# Patient Record
Sex: FEMALE | Race: NATIVE HAWAIIAN OR OTHER PACIFIC ISLANDER | HISPANIC OR LATINO | Employment: FULL TIME | URBAN - METROPOLITAN AREA
[De-identification: names, ages, dates, MRNs, and addresses within clinical notes are randomized per-mention and may not be internally consistent; named-entity substitution may affect disease eponyms.]

---

## 2017-01-08 ENCOUNTER — HOSPITAL ENCOUNTER (EMERGENCY)
Facility: HOSPITAL | Age: 37
Discharge: HOME | End: 2017-01-08
Attending: EMERGENCY MEDICINE
Payer: COMMERCIAL

## 2017-01-08 VITALS
HEART RATE: 68 BPM | WEIGHT: 117 LBS | OXYGEN SATURATION: 100 % | TEMPERATURE: 98 F | RESPIRATION RATE: 16 BRPM | HEIGHT: 61 IN | DIASTOLIC BLOOD PRESSURE: 68 MMHG | SYSTOLIC BLOOD PRESSURE: 102 MMHG | BODY MASS INDEX: 22.09 KG/M2

## 2017-01-08 DIAGNOSIS — R06.02 SOB (SHORTNESS OF BREATH): ICD-10-CM

## 2017-01-08 DIAGNOSIS — M62.830 MUSCLE SPASM OF BACK: Primary | ICD-10-CM

## 2017-01-08 LAB
B-HCG UR QL: NEGATIVE
CTP QC/QA: YES

## 2017-01-08 PROCEDURE — 25000003 PHARM REV CODE 250: Performed by: NURSE PRACTITIONER

## 2017-01-08 PROCEDURE — 93005 ELECTROCARDIOGRAM TRACING: CPT

## 2017-01-08 PROCEDURE — 99284 EMERGENCY DEPT VISIT MOD MDM: CPT | Mod: 25

## 2017-01-08 PROCEDURE — 81025 URINE PREGNANCY TEST: CPT | Performed by: NURSE PRACTITIONER

## 2017-01-08 RX ORDER — ASPIRIN 81 MG/1
81 TABLET ORAL DAILY
COMMUNITY

## 2017-01-08 RX ORDER — METHOCARBAMOL 500 MG/1
1000 TABLET, FILM COATED ORAL 3 TIMES DAILY
Qty: 30 TABLET | Refills: 0 | Status: SHIPPED | OUTPATIENT
Start: 2017-01-08 | End: 2017-01-13

## 2017-01-08 RX ORDER — NAPROXEN 500 MG/1
500 TABLET ORAL 2 TIMES DAILY WITH MEALS
Qty: 14 TABLET | Refills: 0 | Status: SHIPPED | OUTPATIENT
Start: 2017-01-08

## 2017-01-08 RX ORDER — NAPROXEN 500 MG/1
500 TABLET ORAL
Status: COMPLETED | OUTPATIENT
Start: 2017-01-08 | End: 2017-01-08

## 2017-01-08 RX ADMIN — NAPROXEN 500 MG: 500 TABLET ORAL at 05:01

## 2017-01-08 NOTE — ED AVS SNAPSHOT
[facility] [address] [place] [address]               Babita Grewal   2017  5:28 PM   ED    Descripción:  Female : 1980   Departamento:  [facility]           Apodaca Cuidado fue coordinado por:     Provider Role From To    Bao Urbano Jr., MD Attending Provider [phone] --    KEISHA Pollard Nurse Practitioner 17 --      Razón de la leslie     Shoulder Pain           Diagnósticos de Esta Visita        Comentarios    Muscle spasm of back    -  Primario     SOB (shortness of breath)           ED Disposition     Ninguna           Lista de tareas           Información de seguimiento     Realice un seguimiento con:  Jordan Michaels MD    Cómo:  Tyree faisal leslie lo antes posible    Cuándo:  2017    Especialidad:  Obstetrics and Gynecology    Información de contacto:    [address] [place] [address]  NO ACTIVE MEDICAL LICENSE  [place] [address]  [phone]        Recetas para recoger        Disp Refills Start End    naproxen (NAPROSYN) 500 MG tablet 14 tablet 0 2017     Take 1 tablet (500 mg total) by mouth 2 (two) times daily with meals. - Oral    [facility]: [facility] [id number] [place] [address] - [address] AT [facility] No. de tlfo: #: [phone]       methocarbamol (ROBAXIN) 500 MG Tab 30 tablet 0 2017    Take 2 tablets (1,000 mg total) by mouth 3 (three) times daily. - Oral    [facility]: [facility] [address] [facility], [place] - [address] AT [facility] No. de tlfo: #: [phone]         [facility] en Llamada     [facility] En Llamada Línea de Enfermeras - Asistencia   Enfermeras registradas de [facility] pueden ayudarle a reservar faisal leslie, proveer educación para la everton, asesoría clínica, y otros servicios de asesoramiento.   Llame para vitor servicio gratuito a [phone].             Medicamentos           Mensaje sobre Medicamentos      "Verificar los cambios y / o adiciones a armas régimen de medicación son los mismos que discutir con armas médico. Si cualquiera de estos cambios o adiciones son incorrectos, por favor notifique a armas proveedor de atención médica.        EMPEZAR a ashley estos medicamentos NUEVOS        Refills    naproxen (NAPROSYN) 500 MG tablet 0    Sig: Take 1 tablet (500 mg total) by mouth 2 (two) times daily with meals.    Categoría: Print    Vía: Oral    methocarbamol (ROBAXIN) 500 MG Tab 0    Sig: Take 2 tablets (1,000 mg total) by mouth 3 (three) times daily.    Categoría: Print    Vía: Oral      These medications were administered today        Dose Freq    naproxen tablet 500 mg 500 mg ED 1 Time    Sig: Take 1 tablet (500 mg total) by mouth ED 1 Time.    Categoría: Normal    Vía: Oral           Verifique que la siguiente lista de medicamentos es faisal representación exacta de los medicamentos que está tomando actualmente. Si no hay ningunos reportados, la lista puede estar en graves. Si no es correcta, por favor póngase en contacto con armas proveedor de atención médica. Lleve esta lista con usted en marybeth de emergencia.           Medicamentos Actuales     aspirin (ECOTRIN) 81 MG EC tablet Take 81 mg by mouth once daily.    ibuprofen (ADVIL,MOTRIN) 800 MG tablet Take 1 tablet (800 mg total) by mouth 3 (three) times daily.    methocarbamol (ROBAXIN) 500 MG Tab Take 2 tablets (1,000 mg total) by mouth 3 (three) times daily.    naproxen (NAPROSYN) 500 MG tablet Take 1 tablet (500 mg total) by mouth 2 (two) times daily with meals.    oxycodone-acetaminophen (PERCOCET) 5-325 mg per tablet Take 1 tablet by mouth every 6 (six) hours as needed for Pain.           Información de referencia clínica           Jes signos vitales paul     PS Pulso Temperatura Resp [place]o    119/69 (BP Location: Right arm, Patient Position: Sitting) 79 98 °F (36.7 °C) (Oral) 16 5' 1" (1.549 m) 53.1 kg (117 lb)    SpO2 BMI (IM)                99% 22.11 kg/m2     "      Alergias     A partir del:  1/8/2017           Reacciones    Shellfish Containing Products Shortness Of Breath, Swelling      Vacunas     Administradas en la fecha de la visita:  1/8/2017        None      ED Micro, Lab, POCT     Start Ordered       Status Ordering Provider    01/08/17 1742 01/08/17 1741  POCT urine pregnancy  Once      Final result       ED Imaging Orders     Start Ordered       Status Ordering Provider    01/08/17 1742 01/08/17 1741  X-Ray Chest PA And Lateral  1 time imaging      Final result         Instrucciones a shira de ankit         Contracción De Espalda [Sin Lesión] [Back Spasm, No Injury]    La contracción de los músculos de la espalda puede ocurrir luego de faisal torcedura ania de ligamento o torcedura muscular por un movimiento de giro o faisal agachada brusca. También la causa puede ser dormir en faisal posición virgie o en un colchón de virgie calidad. Algunas personas responden a la tensión emocional tensando los músculos de la espalda.  El tratamiento descrito abajo generalmente contribuirá que el dolor desaparezca en 5-7 días. El dolor que continua puede requerir faisal evaluación mayor u otro tipo de tratamiento lucie la terapia física.  A menos que usted haya tenido faisal lesión física (por ejemplo, faisal caída o un accidente de automóvil), no suelen pedirse radiografías (X-rays) para la evaluación inicial del dolor de espalda. Si el dolor persiste y no responde al tratamiento médico, es posible que le soliciten radiografías (X-rays) y otras pruebas más adelante.  Cuidado En [place]:  1. Es posible que necesite permanecer en cama los primeros días. Tess, tan pronto lucie le sea posible, comience a sentarse o pararse para evitar los problemas que pueden aparecer cuando marianna está en cama mucho tiempo (debilidad de los músculos, mayor dolor y rigidez de la espalda, coágulos de emily en las piernas).  2. Mientras esté en cama, intente buscar faisal posición que le resulte cómoda. Lo mejor es utilizar un  colchón firme. Intente acostarse de espalda con almohadas debajo de las rodillas. También puede intentar acostarse de costado con las rodillas flexionadas cerca del pecho y faisal almohada entre las rodillas.  3. Evite estar mucho tiempo sentado. Eso causa más tensión en la parte inferior de la espalda que estando de pie o caminando.  4. Seven los dos primeros días después de la lesión, aplíquese faisal COMPRESA DE HIELO sobre el área dolorida seven 20 minutos cada 2 a 4 horas. Ello reducirá la hinchazón y el dolor. El CALOR (faisal ducha caliente, un baño caliente o faisal almohadilla térmica) funciona shreyas para los espasmos musculares. Puede comenzar con el hielo y luego pasar al calor después de dos días. Algunos pacientes se sienten mejor alternando los tratamientos con hielo y calor. Emplee el método que mejor le resulte.  5. Puede usar acetaminofén (acetaminophen) [Tylenol] o ibuprofeno (ibuprofen) [Motrin o Advil] para controlar el dolor, a menos que le hayan recetado otro medicamento. [NOTA: Si tiene faisal enfermedad hepática o renal crónica (chronic liver or kidney disease), o ha tenido alguna vez faisal úlcera estomacal (stomach ulcer) o sangrado gastrointestinal (GI bleeding), consulte con armas médico antes de ashley estos medicamentos.]  6. Los estiramientos suaves ayudarán a que armas espalda sane más rápido. Tyree esta simple rutina de 2-3 veces al día hasta que sienta que armas espalda está mejor.  ¨ ESTIRAMIENTOS DE LA PARTE BAJA DE LA ESPALDA  § Acuéstese boca ariba con saima rodillas dobladas y ambos pies sobre el piso.  § Lentamente levante armas rodilla izquierda hacia armas pecho aplanando la parte baja de armas espalda sobre el piso. Sostenga seven 5 segundos.  § Relájese y repita el ejercicio con armas rodilla derecha.  § Tyree 10 de estos ejercicios con cada pierna.  § Repita abrazando ambas rodillas y llevándolas hacia armas pecho al mismo tiempo.  7. Infórmese sobre los métodos que se utilizan para levantar cosas pesadas de  manera coronel y utilícelos. No levante nada que pese más de 15 libras (7 kilos) hasta que haya desaparecido el dolor.  Seguimiento  con armas médico o en esta institución si saima síntomas no empiezan a mejorar luego de faisal semana. Puede necesitar terapia física.  [NOTA: Si le hicieron faisal radiografía, la va a revisar un radiólogo. Le avisarán si encuentran algo que pueda afectar armas atención]  Busque Prontamente Atención Médica  si algo de lo siguiente ocurre:  · El dolor empeora o se extiende a saima piernas  · Debilidad o entumecimiento en faisal o ambas piernas  · Pérdida del control intestinal o de la vejiga  · Entumecimiento en el área de la pablo  · Fiebre repentina superior a 100.4°F (38.0°C)  · Ardor o dolor al orinar  © 3187-8959 [employer]. [address] [place] [address]. Todos los derechos reservados. Esta información no pretende sustituir la atención médica profesional. Sólo armas médico puede diagnosticar y tratar un problema de everton.          Registrarse para [facility]     La activación de armas cuenta [facility] es tan fácil lucie 1-2-3!    1) Ir a [email or web address], seleccione Registrarse Ahora, meter el código de activación y armas fecha de nacimiento, y seleccione Próximo.    [id number]  Expires: 2/22/2017  7:00 PM      2) Crear un nombre de usuario y contraseña para usar cuando se visita [facility] en el futuro y selecciona faisal pregunta de seguridad en marybeth de que pierda armas contraseña y seleccione Próximo.    3) Introduzca armas dirección de correo electrónico y lisa sarah en Registrarse!    Información Adicional  Si tiene alguna pregunta, por favor, e-mail [email or web address] o llame al [phone] para hablar con nuestro personal. Recuerde, [facility] no debe ser usada para necesidades urgentes. En marybeth de emergencia médica, llame al [phone].         [facility] cumple con las leyes federales aplicables de derechos civiles y no discrimina por motivos de nancy, color, origen  nacional, edad, discapacidad, o sexo.        Language Assistance Services     ATTENTION: Language assistance services are available, free of charge. Please call [phone].      ATENCIÓN: Si hilario marie, tiene a armas disposición servicios gratuitos de asistencia lingüística. Llame al [phone].     CHÚ Ý: N?u b?n nói Ti?ng Vi?t, có các d?ch v? h? tr? ngôn ng? mi?n phí dành cho b?n. G?i s? [phone].                      [facility] [address]  [place] [address]               Babita Vizcarraio   2017  5:28 PM   ED    Description:  Female : 1980   Department:  [facility]           Your Care was Coordinated By:     Provider Role From To    Bao Urbano Jr., MD Attending Provider 17 --    KEISHA Pollard Nurse Practitioner 17 --      Reason for Visit     Shoulder Pain           Diagnoses this Visit        Comments    Muscle spasm of back    -  Primary     SOB (shortness of breath)           ED Disposition     None           To Do List           Follow-up Information     Follow up with Jordan Michaels MD. Schedule an appointment as soon as possible for a visit in 3 days.    Specialty:  Obstetrics and Gynecology    Contact information:    [address] [place] [address]  NO ACTIVE MEDICAL LICENSE  [facility] [address]  [phone]         These Medications        Disp Refills Start End    naproxen (NAPROSYN) 500 MG tablet 14 tablet 0 2017     Take 1 tablet (500 mg total) by mouth 2 (two) times daily with meals. - Oral    Pharmacy: [facility] [id number] [facility] [address]  #: [phone]       methocarbamol (ROBAXIN) 500 MG Tab 30 tablet 0 2017    Take 2 tablets (1,000 mg total) by mouth 3 (three) times daily. - Oral    [facility] [address] AT [facility] [address] #: [phone]          [facility]     [facility] Care [facility] - 24/7 Assistance  Registered nurses in the [facility] provide clinical advisement, health education, appointment booking, and other advisory services.  Call for this free service at [phone].             Medications           Message regarding Medications     Verify the changes and/or additions to your medication regime listed below are the same as discussed with your clinician today.  If any of these changes or additions are incorrect, please notify your healthcare provider.        START taking these NEW medications        Refills    naproxen (NAPROSYN) 500 MG tablet 0    Sig: Take 1 tablet (500 mg total) by mouth 2 (two) times daily with meals.    Class: Print    Route: Oral    methocarbamol (ROBAXIN) 500 MG Tab 0    Sig: Take 2 tablets (1,000 mg total) by mouth 3 (three) times daily.    Class: Print    Route: Oral      These medications were administered today        Dose Freq    naproxen tablet 500 mg 500 mg ED 1 Time    Sig: Take 1 tablet (500 mg total) by mouth ED 1 Time.    Class: Normal    Route: Oral           Verify that the below list of medications is an accurate representation of the medications you are currently taking.  If none reported, the list may be blank. If incorrect, please contact your healthcare provider. Carry this list with you in case of emergency.           Current Medications     aspirin (ECOTRIN) 81 MG EC tablet Take 81 mg by mouth once daily.    ibuprofen (ADVIL,MOTRIN) 800 MG tablet Take 1 tablet (800 mg total) by mouth 3 (three) times daily.    methocarbamol (ROBAXIN) 500 MG Tab Take 2 tablets (1,000 mg total) by mouth 3 (three) times daily.    naproxen (NAPROSYN) 500 MG tablet Take 1 tablet (500 mg total) by mouth 2 (two) times daily with meals.    oxycodone-acetaminophen (PERCOCET) 5-325 mg per tablet Take 1 tablet by mouth every 6 (six) hours as needed for Pain.           Clinical Reference Information  "          Your Vitals Were     BP Pulse Temp Resp Height Weight    119/69 (BP Location: Right arm, Patient Position: Sitting) 79 98 °F (36.7 °C) (Oral) 16 5' 1" (1.549 m) 53.1 kg (117 lb)    SpO2 BMI                99% 22.11 kg/m2          Allergies as of 1/8/2017        Reactions    Shellfish Containing Products Shortness Of Breath, Swelling      Immunizations Administered on Date of Encounter - 1/8/2017     None      ED Micro, Lab, POCT     Start Ordered       Status Ordering Provider    01/08/17 1742 01/08/17 1741  POCT urine pregnancy  Once      Final result       ED Imaging Orders     Start Ordered       Status Ordering Provider    01/08/17 1742 01/08/17 1741  X-Ray Chest PA And Lateral  1 time imaging      Final result         Discharge Instructions         Contracción De Espalda [Sin Lesión] [Back Spasm, No Injury]    La contracción de los músculos de la espalda puede ocurrir luego de faisal torcedura ania de ligamento o torcedura muscular por un movimiento de giro o faisal agachada brusca. También la causa puede ser dormir en faisal posición virgie o en un colchón de virgie calidad. Algunas personas responden a la tensión emocional tensando los músculos de la espalda.  El tratamiento descrito abajo generalmente contribuirá que el dolor desaparezca en 5-7 días. El dolor que continua puede requerir faisal evaluación mayor u otro tipo de tratamiento lucie la terapia física.  A menos que usted haya tenido faisal lesión física (por ejemplo, faisal caída o un accidente de automóvil), no suelen pedirse radiografías (X-rays) para la evaluación inicial del dolor de espalda. Si el dolor persiste y no responde al tratamiento médico, es posible que le soliciten radiografías (X-rays) y otras pruebas más adelante.  Cuidado En [place]:  1. Es posible que necesite permanecer en cama los primeros días. Tess, tan pronto lucie le sea posible, comience a sentarse o pararse para evitar los problemas que pueden aparecer cuando marianna está en cama mucho " tiempo (debilidad de los músculos, mayor dolor y rigidez de la espalda, coágulos de emily en las piernas).  2. Mientras esté en cama, intente buscar faisal posición que le resulte cómoda. Lo mejor es utilizar un colchón firme. Intente acostarse de espalda con almohadas debajo de las rodillas. También puede intentar acostarse de costado con las rodillas flexionadas cerca del pecho y faisal almohada entre las rodillas.  3. Evite estar mucho tiempo sentado. Eso causa más tensión en la parte inferior de la espalda que estando de pie o caminando.  4. Seven los dos primeros días después de la lesión, aplíquese faisal COMPRESA DE HIELO sobre el área dolorida seven 20 minutos cada 2 a 4 horas. Ello reducirá la hinchazón y el dolor. El CALOR (faisal ducha caliente, un baño caliente o faisal almohadilla térmica) funciona shreyas para los espasmos musculares. Puede comenzar con el hielo y luego pasar al calor después de dos días. Algunos pacientes se sienten mejor alternando los tratamientos con hielo y calor. Emplee el método que mejor le resulte.  5. Puede usar acetaminofén (acetaminophen) [Tylenol] o ibuprofeno (ibuprofen) [Motrin o Advil] para controlar el dolor, a menos que le hayan recetado otro medicamento. [NOTA: Si tiene faisal enfermedad hepática o renal crónica (chronic liver or kidney disease), o ha tenido alguna vez faisal úlcera estomacal (stomach ulcer) o sangrado gastrointestinal (GI bleeding), consulte con armas médico antes de ashley estos medicamentos.]  6. Los estiramientos suaves ayudarán a que armas espalda sane más rápido. Tyree esta simple rutina de 2-3 veces al día hasta que sienta que armas espalda está mejor.  ¨ ESTIRAMIENTOS DE LA PARTE BAJA DE LA ESPALDA  § Acuéstese boca ariba con saima rodillas dobladas y ambos pies sobre el piso.  § Lentamente levante armas rodilla izquierda hacia armas pecho aplanando la parte baja de armas espalda sobre el piso. Sostenga seven 5 segundos.  § Relájese y repita el ejercicio con armas rodilla  derecha.  § Tyree 10 de estos ejercicios con cada pierna.  § Repita abrazando ambas rodillas y llevándolas hacia armas pecho al mismo tiempo.  7. Infórmese sobre los métodos que se utilizan para levantar cosas pesadas de manera coronel y utilícelos. No levante nada que pese más de 15 libras (7 kilos) hasta que haya desaparecido el dolor.  Seguimiento  con armas médico o en esta institución si saima síntomas no empiezan a mejorar luego de faisal semana. Puede necesitar terapia física.  [NOTA: Si le hicieron faisal radiografía, la va a revisar un radiólogo. Le avisarán si encuentran algo que pueda afectar armas atención]  Busque Prontamente Atención Médica  si algo de lo siguiente ocurre:  · El dolor empeora o se extiende a saima piernas  · Debilidad o entumecimiento en faisal o ambas piernas  · Pérdida del control intestinal o de la vejiga  · Entumecimiento en el área de la pablo  · Fiebre repentina superior a 100.4°F (38.0°C)  · Ardor o dolor al orinar  © 1886-1699 [employer]. [address] [place] [address], [place] [address]. Todos los derechos reservados. Esta información no pretende sustituir la atención médica profesional. Sólo armas médico puede diagnosticar y tratar un problema de everton.          [facility] Sign-Up     Activating your [facility] account is as easy as 1-2-3!     1) Visit [email or web address], select Sign Up Now, enter this activation code and your date of birth, then select Next.  [id number]  Expires: 2/22/2017  7:00 PM      2) Create a username and password to use when you visit [facility] in the future and select a security question in case you lose your password and select Next.    3) Enter your e-mail address and click Sign Up!    Additional Information  If you have questions, please e-mail [email or web address] or call [phone] to talk to our [facility] staff. Remember, [facility] is NOT to be used for urgent needs. For medical emergencies, dial 911.          [facility] complies with  applicable Federal civil rights laws and does not discriminate on the basis of race, color, national origin, age, disability, or sex.        Language Assistance Services     ATTENTION: Language assistance services are available, free of charge. Please call [phone].      ATENCIÓN: Si habla cynthia, tiene a armas disposición servicios gratuitos de asistencia lingüística. Llame al [phone].     CHÚ Ý: N?u b?n nói Ti?ng Vi?t, có các d?ch v? h? tr? ngôn ng? mi?n phí dành cho b?n. G?i s? [phone].

## 2017-01-08 NOTE — ED TRIAGE NOTES
"left shoulder pain that radiates up to neck and down through left axilla and down left side of back x 3+ months. Pt states " pain feels like " its on the inside of me"  Worse over last 2 weeks.  Pain is sharp and intermittent denies any trauma, no decreased ROM   "

## 2017-01-08 NOTE — ED PROVIDER NOTES
Encounter Date: 1/8/2017       History     Chief Complaint   Patient presents with    Shoulder Pain     left shoulder pain that radiates up to neck and down through left axilla and down left side of back x 3+ months.  Worse over last 2 weeks.  Pain is sharp and intermittent      Review of patient's allergies indicates:   Allergen Reactions    Shellfish containing products Shortness Of Breath and Swelling     HPI Comments: 37yo female, previously healthy, here for eval of left shoulder pain which radiates to her neck and to her left upper back and chest for >3 months.  Pt reports the pain has increased over the past two weeks.   She has seen her PCP for this complaint and was ordered a breast US, which was negative.  Pt reports she felt SOB today while [place] the mall.  Pt denies fever, trauma, weakness, numbness/tingling, palpitations, cough, hemoptysis, abd pain, n/v/d, dysuria, and rash.  Pt has tried nothing for her symptoms today.      Patient is a 36 y.o. female presenting with the following complaint: arm injury. The history is provided by the patient.   Arm Injury    Illness onset: Over three months, worse in the past two weeks.  The injury mechanism is unknown. She came to the ER via by private vehicle. There is an injury to the upper back. There is an injury to the left shoulder. There have been no prior injuries to these areas. She is right-handed. She has received no recent medical care. She reports no foreign bodies present. Associated symptoms include chest pain and neck pain (left). Pertinent negatives include no numbness, no abdominal pain, no nausea, no vomiting, no headaches, no focal weakness, no light-headedness, no tingling, no weakness, no cough and no difficulty breathing.     History reviewed. No pertinent past medical history.  Past Medical History Pertinent Negatives   Diagnosis Date Noted    Diabetes mellitus 4/11/2014    Hypertension 4/11/2014     History reviewed. No pertinent past  surgical history.  History reviewed. No pertinent family history.  Social History   Substance Use Topics    Smoking status: Never Smoker    Smokeless tobacco: None    Alcohol use No     Review of Systems   Constitutional: Negative for chills, fatigue and fever.   HENT: Negative for ear pain, rhinorrhea and sore throat.    Respiratory: Positive for shortness of breath. Negative for cough, chest tightness and wheezing.    Cardiovascular: Positive for chest pain.   Gastrointestinal: Negative for abdominal pain, diarrhea, nausea and vomiting.   Genitourinary: Negative for dysuria.   Musculoskeletal: Positive for arthralgias (left shoulder) and neck pain (left).   Skin: Negative for rash.   Allergic/Immunologic: Negative for immunocompromised state.   Neurological: Negative for tingling, focal weakness, weakness, light-headedness, numbness and headaches.   Psychiatric/Behavioral: Negative for confusion.   All other systems reviewed and are negative.      Physical Exam   Initial Vitals   BP Pulse Resp Temp SpO2   01/08/17 1721 01/08/17 1721 01/08/17 1721 01/08/17 1721 01/08/17 1721   119/69 79 16 98 °F (36.7 °C) 99 %     Physical Exam    Nursing note and vitals reviewed.  Constitutional: Vital signs are normal. She appears well-developed and well-nourished. She is active and cooperative.  Non-toxic appearance. She does not have a sickly appearance. She does not appear ill.   HENT:   Head: Normocephalic and atraumatic.   Eyes: Conjunctivae and EOM are normal.   Neck: Normal range of motion and full passive range of motion without pain. Neck supple. No spinous process tenderness and no muscular tenderness present.   Cardiovascular: Normal rate, regular rhythm and normal heart sounds.   Pulses:       Radial pulses are 2+ on the right side, and 2+ on the left side.   Pulmonary/Chest: Effort normal and breath sounds normal. She exhibits no tenderness and no crepitus.   Abdominal: Soft. Normal appearance and bowel sounds are  normal. There is no tenderness.   Musculoskeletal:        Left shoulder: Normal.        Left elbow: Normal.        Left upper arm: Normal.   Neurological: She is alert and oriented to person, place, and time. She has normal strength. GCS eye subscore is 4. GCS verbal subscore is 5. GCS motor subscore is 6.   Skin: Skin is warm, dry and intact. No bruising and no rash noted.   Psychiatric: She has a normal mood and affect. Her speech is normal and behavior is normal. Judgment and thought content normal. Cognition and memory are normal.         ED Course   Procedures  Labs Reviewed   POCT URINE PREGNANCY        Imaging Results         X-Ray Chest PA And Lateral (Final result) Result time:  01/08/17 17:51:57    Final result by Cali Aviles MD (01/08/17 17:51:57)    Impression:        No acute cardiothoracic disease evident.       Electronically signed by: Dr. Cali Aviles MD  Date:     01/08/17  Time:    17:51     Narrative:    TWO VIEW CHEST:     Comparison: 9/11/14    Findings:    Mild scoliosis.The cardiac silhouette and the pulmonary vasculature are normal in size.  The mediastinal and hilar contours are unremarkable.  There is no pneumothorax.  No pleural effusion.  The lungs are clear.  No acute bony abnormality.                 Medical Decision Making:   Initial Assessment:   37yo female, previously healthy, here for eval of left shoulder pain for over three months, worse in the past two weeks.  Pt reports she was SOB while at the mall today.  Pt denies fever, trauma, weakness, numbness/tingling, hemoptysis, cough, palpitations, abd pain, n/v/d, dysuria, rash. Pt appears well, lungs CTA and equal bilaterally.  No chest wall tenderness.  HR RRR.  Shoulder with full nonpainful AROM.  Radial pulses 2+ bilaterally by palp.  No rash, no signs of trauma.   Differential Diagnosis:   Sprain, strain, spasm, chest wall pain, arrhythmia, pneumothorax, pneumonia, intrathoracic mass  Clinical Tests:   Lab Tests:  Ordered and Reviewed  Radiological Study: Ordered and Reviewed  Medical Tests: Ordered and Reviewed  ED Management:  CXR, EKG, PO meds  EKG WNL.  CXR negative for acute change.  Feel the pt's symptoms are due to muscle spasm.  RX naprosyn and Robaxin.  Advised f/u with PCP within three days and return to the ED if condition changes, progresses, or if there are concerns.  Pt verbalized understanding and compliance.                    ED Course     Clinical Impression:   The primary encounter diagnosis was Muscle spasm of back. A diagnosis of SOB (shortness of breath) was also pertinent to this visit.          Lety Waters, KEISHA  01/08/17 [id number]

## 2017-01-09 NOTE — DISCHARGE INSTRUCTIONS
Contracción De Espalda [Sin Lesión] [Back Spasm, No Injury]    La contracción de los músculos de la espalda puede ocurrir luego de faisal torcedura ania de ligamento o torcedura muscular por un movimiento de giro o faisal agachada brusca. También la causa puede ser dormir en faisal posición virgie o en un colchón de virgie calidad. Algunas personas responden a la tensión emocional tensando los músculos de la espalda.  El tratamiento descrito abajo generalmente contribuirá que el dolor desaparezca en 5-7 días. El dolor que continua puede requerir faisal evaluación mayor u otro tipo de tratamiento lucie la terapia física.  A menos que usted haya tenido faisal lesión física (por ejemplo, faisal caída o un accidente de automóvil), no suelen pedirse radiografías (X-rays) para la evaluación inicial del dolor de espalda. Si el dolor persiste y no responde al tratamiento médico, es posible que le soliciten radiografías (X-rays) y otras pruebas más adelante.  Cuidado En [place]:  1. Es posible que necesite permanecer en cama los primeros días. Tess, tan pronto lucie le sea posible, comience a sentarse o pararse para evitar los problemas que pueden aparecer cuando marianna está en cama mucho tiempo (debilidad de los músculos, mayor dolor y rigidez de la espalda, coágulos de emily en las piernas).  2. Mientras esté en cama, intente buscar faisal posición que le resulte cómoda. Lo mejor es utilizar un colchón firme. Intente acostarse de espalda con almohadas debajo de las rodillas. También puede intentar acostarse de costado con las rodillas flexionadas cerca del pecho y faisal almohada entre las rodillas.  3. Evite estar mucho tiempo sentado. Eso causa más tensión en la parte inferior de la espalda que estando de pie o caminando.  4. Seven los dos primeros días después de la lesión, aplíquese faisal COMPRESA DE HIELO sobre el área dolorida seven 20 minutos cada 2 a 4 horas. Ello reducirá la hinchazón y el dolor. El CALOR (faisal ducha caliente, un baño caliente  o faisal almohadilla térmica) funciona shreyas para los espasmos musculares. Puede comenzar con el hielo y luego pasar al calor después de dos días. Algunos pacientes se sienten mejor alternando los tratamientos con hielo y calor. Emplee el método que mejor le resulte.  5. Puede usar acetaminofén (acetaminophen) [Tylenol] o ibuprofeno (ibuprofen) [Motrin o Advil] para controlar el dolor, a menos que le hayan recetado otro medicamento. [NOTA: Si tiene faisal enfermedad hepática o renal crónica (chronic liver or kidney disease), o ha tenido alguna vez faisal úlcera estomacal (stomach ulcer) o sangrado gastrointestinal (GI bleeding), consulte con armas médico antes de ashley estos medicamentos.]  6. Los estiramientos suaves ayudarán a que armas espalda sane más rápido. Tyree esta simple rutina de 2-3 veces al día hasta que sienta que armas espalda está mejor.  ¨ ESTIRAMIENTOS DE LA PARTE BAJA DE LA ESPALDA  § Acuéstese boca ariba con saima rodillas dobladas y ambos pies sobre el piso.  § Lentamente levante armas rodilla izquierda hacia armas pecho aplanando la parte baja de armas espalda sobre el piso. Sostenga seven 5 segundos.  § Relájese y repita el ejercicio con armas rodilla derecha.  § Tyree 10 de estos ejercicios con cada pierna.  § Repita abrazando ambas rodillas y llevándolas hacia armas pecho al mismo tiempo.  7. Infórmese sobre los métodos que se utilizan para levantar cosas pesadas de manera coronel y utilícelos. No levante nada que pese más de 15 libras (7 kilos) hasta que haya desaparecido el dolor.  Seguimiento  con armas médico o en esta institución si saima síntomas no empiezan a mejorar luego de faisal semana. Puede necesitar terapia física.  [NOTA: Si le hicieron faisal radiografía, la va a revisar un radiólogo. Le avisarán si encuentran algo que pueda afectar armas atención]  Busque Prontamente Atención Médica  si algo de lo siguiente ocurre:  · El dolor empeora o se extiende a saima piernas  · Debilidad o entumecimiento en faisal o ambas piernas  · Pérdida  del control intestinal o de la vejiga  · Entumecimiento en el área de la pablo  · Fiebre repentina superior a 100.4°F (38.0°C)  · Ardor o jonas al xiang  © 0724-5018 [employer]. [address] [place] [id number]. Todos los derechos reservados. Esta información no pretende sustituir la atención médica profesional. Sólo armas médico puede diagnosticar y tratar un problema de everton.

## 2017-01-11 DIAGNOSIS — R06.02 SOB (SHORTNESS OF BREATH): Primary | ICD-10-CM

## 2017-12-14 ENCOUNTER — OFFICE VISIT (OUTPATIENT)
Dept: URGENT CARE | Facility: CLINIC | Age: 37
End: 2017-12-14
Payer: COMMERCIAL

## 2017-12-14 VITALS
WEIGHT: 128 LBS | RESPIRATION RATE: 18 BRPM | HEIGHT: 61 IN | SYSTOLIC BLOOD PRESSURE: 127 MMHG | DIASTOLIC BLOOD PRESSURE: 84 MMHG | OXYGEN SATURATION: 100 % | BODY MASS INDEX: 24.17 KG/M2 | TEMPERATURE: 98 F | HEART RATE: 74 BPM
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
## 2017-12-14 DIAGNOSIS — M41.9 SCOLIOSIS, UNSPECIFIED SCOLIOSIS TYPE, UNSPECIFIED SPINAL REGION: ICD-10-CM

## 2017-12-14 DIAGNOSIS — R10.9 ABDOMINAL PAIN, UNSPECIFIED ABDOMINAL LOCATION: ICD-10-CM

## 2017-12-14 LAB
B-HCG UR QL: NEGATIVE
BILIRUB UR QL STRIP: POSITIVE
CTP QC/QA: YES
GLUCOSE UR QL STRIP: NEGATIVE
KETONES UR QL STRIP: NEGATIVE
LEUKOCYTE ESTERASE UR QL STRIP: POSITIVE
PH, POC UA: 6 (ref 5–8)
POC BLOOD, URINE: NEGATIVE
POC NITRATES, URINE: NEGATIVE
PROT UR QL STRIP: NEGATIVE
SP GR UR STRIP: 1.01 (ref 1–1.03)
UROBILINOGEN UR STRIP-ACNC: ABNORMAL (ref 0.1–1.1)

## 2017-12-14 PROCEDURE — 81003 URINALYSIS AUTO W/O SCOPE: CPT | Mod: QW,S$GLB,, | Performed by: FAMILY MEDICINE

## 2017-12-14 PROCEDURE — 99214 OFFICE O/P EST MOD 30 MIN: CPT | Mod: 25,S$GLB,, | Performed by: FAMILY MEDICINE

## 2017-12-14 PROCEDURE — 81025 URINE PREGNANCY TEST: CPT | Mod: S$GLB,,, | Performed by: FAMILY MEDICINE

## 2017-12-14 NOTE — LETTER
December 14, 2017      [facility]  [address]  [place] [address]  Phone: [phone]  Fax: [phone]       Patient: Babita Grewal   YOB: 1980  Date of Visit: 12/14/2017    To Whom It May Concern:    Erin Grewal  was at [facility] on 12/14/2017. She may return to work/school on 12/16/2017 with no restrictions. If you have any questions or concerns, or if I can be of further assistance, please do not hesitate to contact me.    Sincerely,    Martha Matias MA
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
## 2018-07-25 ENCOUNTER — HOSPITAL ENCOUNTER (EMERGENCY)
Facility: HOSPITAL | Age: 38
Discharge: HOME | End: 2018-07-25
Attending: EMERGENCY MEDICINE
Payer: COMMERCIAL

## 2018-07-25 VITALS
HEIGHT: 61 IN | DIASTOLIC BLOOD PRESSURE: 82 MMHG | RESPIRATION RATE: 13 BRPM | SYSTOLIC BLOOD PRESSURE: 126 MMHG | TEMPERATURE: 97 F | WEIGHT: 128 LBS | OXYGEN SATURATION: 96 % | HEART RATE: 76 BPM | BODY MASS INDEX: 24.17 KG/M2

## 2018-07-25 DIAGNOSIS — Z77.21 EXPOSURE TO BLOOD OR BODY FLUID: Primary | ICD-10-CM

## 2018-07-25 LAB
ALBUMIN SERPL BCP-MCNC: 4.1 G/DL
ALP SERPL-CCNC: 76 U/L
ALT SERPL W/O P-5'-P-CCNC: 12 U/L
ANION GAP SERPL CALC-SCNC: 11 MMOL/L
AST SERPL-CCNC: 18 U/L
BASOPHILS # BLD AUTO: 0.05 K/UL
BASOPHILS NFR BLD: 0.4 %
BILIRUB SERPL-MCNC: 0.5 MG/DL
BUN SERPL-MCNC: 9 MG/DL
CALCIUM SERPL-MCNC: 9.8 MG/DL
CHLORIDE SERPL-SCNC: 103 MMOL/L
CO2 SERPL-SCNC: 26 MMOL/L
CREAT SERPL-MCNC: 0.7 MG/DL
DIFFERENTIAL METHOD: ABNORMAL
EOSINOPHIL # BLD AUTO: 0.2 K/UL
EOSINOPHIL NFR BLD: 1.9 %
ERYTHROCYTE [DISTWIDTH] IN BLOOD BY AUTOMATED COUNT: 12.8 %
EST. GFR  (AFRICAN AMERICAN): >60 ML/MIN/1.73 M^2
EST. GFR  (NON AFRICAN AMERICAN): >60 ML/MIN/1.73 M^2
GLUCOSE SERPL-MCNC: 75 MG/DL
HCT VFR BLD AUTO: 41 %
HGB BLD-MCNC: 13.2 G/DL
IMM GRANULOCYTES # BLD AUTO: 0.03 K/UL
IMM GRANULOCYTES NFR BLD AUTO: 0.3 %
LYMPHOCYTES # BLD AUTO: 2.4 K/UL
LYMPHOCYTES NFR BLD: 20.6 %
MCH RBC QN AUTO: 28.6 PG
MCHC RBC AUTO-ENTMCNC: 32.2 G/DL
MCV RBC AUTO: 89 FL
MONOCYTES # BLD AUTO: 0.8 K/UL
MONOCYTES NFR BLD: 7.1 %
NEUTROPHILS # BLD AUTO: 8.2 K/UL
NEUTROPHILS NFR BLD: 69.7 %
NRBC BLD-RTO: 0 /100 WBC
PLATELET # BLD AUTO: 308 K/UL
PMV BLD AUTO: 10.2 FL
POTASSIUM SERPL-SCNC: 3.9 MMOL/L
PROT SERPL-MCNC: 7.7 G/DL
RBC # BLD AUTO: 4.61 M/UL
SODIUM SERPL-SCNC: 140 MMOL/L
WBC # BLD AUTO: 11.83 K/UL

## 2018-07-25 PROCEDURE — 99283 EMERGENCY DEPT VISIT LOW MDM: CPT

## 2018-07-25 PROCEDURE — 86703 HIV-1/HIV-2 1 RESULT ANTBDY: CPT

## 2018-07-25 PROCEDURE — 86705 HEP B CORE ANTIBODY IGM: CPT

## 2018-07-25 PROCEDURE — 80053 COMPREHEN METABOLIC PANEL: CPT

## 2018-07-25 PROCEDURE — 86706 HEP B SURFACE ANTIBODY: CPT

## 2018-07-25 PROCEDURE — 85025 COMPLETE CBC W/AUTO DIFF WBC: CPT

## 2018-07-25 PROCEDURE — 25000003 PHARM REV CODE 250: Performed by: EMERGENCY MEDICINE

## 2018-07-25 PROCEDURE — 99283 EMERGENCY DEPT VISIT LOW MDM: CPT | Mod: ,,, | Performed by: EMERGENCY MEDICINE

## 2018-07-25 RX ORDER — TENOFOVIR DISOPROXIL FUMARATE 300 MG/1
300 TABLET, FILM COATED ORAL
Status: COMPLETED | OUTPATIENT
Start: 2018-07-25 | End: 2018-07-25

## 2018-07-25 RX ORDER — EMTRICITABINE AND TENOFOVIR DISOPROXIL FUMARATE 200; 300 MG/1; MG/1
1 TABLET, FILM COATED ORAL DAILY
Qty: 30 TABLET | Refills: 0 | Status: SHIPPED | OUTPATIENT
Start: 2018-07-25 | End: 2018-07-25

## 2018-07-25 RX ORDER — EMTRICITABINE AND TENOFOVIR DISOPROXIL FUMARATE 200; 300 MG/1; MG/1
1 TABLET, FILM COATED ORAL DAILY
Qty: 30 TABLET | Refills: 0 | Status: SHIPPED | OUTPATIENT
Start: 2018-07-25 | End: 2018-08-24

## 2018-07-25 RX ORDER — EMTRICITABINE 200 MG/1
200 CAPSULE ORAL
Status: COMPLETED | OUTPATIENT
Start: 2018-07-25 | End: 2018-07-25

## 2018-07-25 RX ADMIN — TENOFOVIR DISOPROXIL FUMARATE 300 MG: 300 TABLET, FILM COATED ORAL at 10:07

## 2018-07-25 RX ADMIN — EMTRICITABINE 200 MG: 200 CAPSULE ORAL at 10:07

## 2018-07-25 RX ADMIN — RALTEGRAVIR 400 MG: 400 TABLET, FILM COATED ORAL at 10:07

## 2018-07-26 LAB
HBV CORE IGM SERPL QL IA: POSITIVE
HBV SURFACE AB SER-ACNC: POSITIVE M[IU]/ML
HIV 1+2 AB+HIV1 P24 AG SERPL QL IA: NEGATIVE

## 2018-07-26 NOTE — ED PROVIDER NOTES
Encounter Date: 7/25/2018    SCRIBE #1 NOTE: I, Cindy Barraza, am scribing for, and in the presence of,  Dr. Mclean. I have scribed the entire note.       History     Chief Complaint   Patient presents with    Body Fluid Exposure     Needle Stick exposure at work ([employer]). Status of source patient is unknown. They are doing a rapid HIV on the patient. Needs HIV Exposure Order Set.     Time patient was seen by the provider: 10:44 PM      The patient is a 38 y.o. female with no known co-morbidities who presents to the ED with a complaint of a needle stick exposure with an associated puncture wound since this afternoon. 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 She states that she immediately washed her finger after the exposure. She reports that the dialysis patient's HIV status is unknown. Denies arm pain, fever, chills, n/v/d, recent illnesses, IV drug use, recent incarcerations, multiple sex partners, or the presence of any tattoos.         The history is provided by the patient.     Review of patient's allergies indicates:   Allergen Reactions    Shellfish containing products Shortness Of Breath and Swelling     Past medical history:  Denies any previous medical history or diagnosis  Past surgical history:  Denies any previous surgeries    No family history on file.  Social History   Substance Use Topics    Smoking status: Never Smoker    Smokeless tobacco: Not on file    Alcohol use No     Review of Systems   Constitutional: Negative for chills and fever.   HENT: Negative for nosebleeds.    Eyes: Negative for visual disturbance.   Respiratory: Negative for wheezing.    Cardiovascular: Negative for chest pain.   Gastrointestinal: Negative for diarrhea, nausea and vomiting.   Musculoskeletal: Negative for gait problem.        (-) arm pain   Skin: Positive for wound (puncture wound on  right middle finger).   Neurological: Negative for speech difficulty.   Psychiatric/Behavioral: Negative for confusion.       Physical Exam     Initial Vitals [07/25/18 2140]   BP Pulse Resp Temp SpO2   138/81 80 18 98.4 °F (36.9 °C) 99 %      MAP       --         Physical Exam    Nursing note and vitals reviewed.    Gen/Constitutional: Interactive. No acute distress  Head: Normocephalic, Atraumatic  Neck: supple, no masses or LAD  Eyes: PERRLA, conjunctiva clear  Ears, Nose and Throat: No rhinorrhea or stridor.  Cardiac: Reg Rhythm, No murmur  Pulmonary: CTA Bilat, no wheezes, rhonchi, rales.  GI: Abdomen soft, non-tender, non-distended; no rebound or guarding  : No CVA tenderness.  Musculoskeletal: Extremities warm, well perfused, no erythema, no edema  Skin: Right middle finger with small puncture wound, without surrounding erythema, edema or extension.  No pain with flexion or extension.  Sensory intact to light touch.  Neuro: Alert and Oriented x 3; No focal motor or sensory deficits.    Psych: Normal affect    ED Course   Procedures  Labs Reviewed   HIV 1 / 2 ANTIBODY   HEPATITIS B SURFACE ANTIBODY   HEPATITIS B CORE ANTIBODY, IGM   CBC W/ AUTO DIFFERENTIAL   COMPREHENSIVE METABOLIC PANEL           Medical Decision Making:   History:   Old Medical Records: I decided to obtain old medical records.  Initial Assessment:   37 y/o female presenting with body fluid exposure. My differential diagnosis includes: exposure to hepatitis, exposure to HIV, contamination, wellness examination, finger infection, tenosynovitis.    Clinical Tests:   Lab Tests: Ordered and Reviewed    Well-appearing female presenting with body fluid exposure.  Unknown history of HIV or hepatitis in the patient.  Given unknown exposure risk, patient was treated with post exposure prophylaxis, counseled by the pharmacist and myself, and given a prescription for 30 days for antiviral therapy, post exposure prophylaxis.  No signs of  tenosynovitis, active finger infection or cellulitis.  Plan is to have patient follow up with her PCP, with strict ED precautions and return instructions.  Patient denies any risky behavior at this time, no signs of trauma, or no active infection. Patient agreeable to discharge plan. Strict ED precautions and return instructions discussed at length and patient verbalized understanding. All questions were answered and ample time was given for questions.              Scribe Attestation:   Scribe #1: I performed the above scribed service and the documentation accurately describes the services I performed. I attest to the accuracy of the note.    I, Dr. Jaylon Mclean, personally performed the services described in this documentation. All medical record entries made by the scribe were at my direction and in my presence.  I have reviewed the chart and agree that the record reflects my personal performance and is accurate and complete.              Clinical Impression:   The encounter diagnosis was Exposure to blood or body fluid.      Disposition:   Disposition: Discharged  Condition: Stable         Jaylon Mclean DO  Dept of Emergency Medicine   [facility]  Spectralink: [id number]             Jaylon Mclean DO  07/25/18 [id number]

## 2020-06-25 ENCOUNTER — LAB VISIT (OUTPATIENT)
Dept: PRIMARY CARE CLINIC | Facility: OTHER | Age: 40
End: 2020-06-25
Payer: COMMERCIAL

## 2020-06-25 DIAGNOSIS — Z03.818 ENCOUNTER FOR OBSERVATION FOR SUSPECTED EXPOSURE TO OTHER BIOLOGICAL AGENTS RULED OUT: ICD-10-CM

## 2020-06-25 PROCEDURE — U0003 INFECTIOUS AGENT DETECTION BY NUCLEIC ACID (DNA OR RNA); SEVERE ACUTE RESPIRATORY SYNDROME CORONAVIRUS 2 (SARS-COV-2) (CORONAVIRUS DISEASE [COVID-19]), AMPLIFIED PROBE TECHNIQUE, MAKING USE OF HIGH THROUGHPUT TECHNOLOGIES AS DESCRIBED BY CMS-2020-01-R: HCPCS

## 2020-06-27 LAB — SARS-COV-2 RNA RESP QL NAA+PROBE: NOT DETECTED

## 2021-04-15 ENCOUNTER — PATIENT MESSAGE (OUTPATIENT)
Dept: RESEARCH | Facility: HOSPITAL | Age: 41
End: 2021-04-15

## 2021-11-03 ENCOUNTER — LAB VISIT (OUTPATIENT)
Dept: LAB | Facility: HOSPITAL | Age: 41
End: 2021-11-03
Attending: INTERNAL MEDICINE
Payer: COMMERCIAL

## 2021-11-03 DIAGNOSIS — R10.84 ABDOMINAL PAIN, GENERALIZED: ICD-10-CM

## 2021-11-03 DIAGNOSIS — E78.2 MIXED HYPERLIPIDEMIA: Primary | ICD-10-CM

## 2021-11-03 DIAGNOSIS — M15.9 GENERALIZED OSTEOARTHROSIS, INVOLVING MULTIPLE SITES: ICD-10-CM

## 2021-11-03 DIAGNOSIS — R23.3 SPONTANEOUS ECCHYMOSES: ICD-10-CM

## 2021-11-03 DIAGNOSIS — Z01.84 IMMUNITY STATUS TESTING: ICD-10-CM

## 2021-11-03 DIAGNOSIS — D64.9 ANEMIA, UNSPECIFIED: ICD-10-CM

## 2021-11-03 LAB
ALBUMIN SERPL BCP-MCNC: 3.9 G/DL (ref 3.5–5.2)
ALP SERPL-CCNC: 62 U/L (ref 55–135)
ALT SERPL W/O P-5'-P-CCNC: 12 U/L (ref 10–44)
ANION GAP SERPL CALC-SCNC: 6 MMOL/L (ref 8–16)
APTT BLDCRRT: 25.2 SEC (ref 21–32)
AST SERPL-CCNC: 13 U/L (ref 10–40)
BASOPHILS # BLD AUTO: 0.05 K/UL (ref 0–0.2)
BASOPHILS NFR BLD: 0.6 % (ref 0–1.9)
BILIRUB SERPL-MCNC: 0.3 MG/DL (ref 0.1–1)
BUN SERPL-MCNC: 10 MG/DL (ref 6–20)
CALCIUM SERPL-MCNC: 9.6 MG/DL (ref 8.7–10.5)
CHLORIDE SERPL-SCNC: 105 MMOL/L (ref 95–110)
CO2 SERPL-SCNC: 27 MMOL/L (ref 23–29)
CREAT SERPL-MCNC: 0.8 MG/DL (ref 0.5–1.4)
DIFFERENTIAL METHOD: ABNORMAL
EOSINOPHIL # BLD AUTO: 0.2 K/UL (ref 0–0.5)
EOSINOPHIL NFR BLD: 2.6 % (ref 0–8)
ERYTHROCYTE [DISTWIDTH] IN BLOOD BY AUTOMATED COUNT: 12.8 % (ref 11.5–14.5)
EST. GFR  (AFRICAN AMERICAN): >60 ML/MIN/1.73 M^2
EST. GFR  (NON AFRICAN AMERICAN): >60 ML/MIN/1.73 M^2
GLUCOSE SERPL-MCNC: 96 MG/DL (ref 70–110)
HCT VFR BLD AUTO: 40.6 % (ref 37–48.5)
HGB BLD-MCNC: 12.8 G/DL (ref 12–16)
IMM GRANULOCYTES # BLD AUTO: 0.02 K/UL (ref 0–0.04)
IMM GRANULOCYTES NFR BLD AUTO: 0.2 % (ref 0–0.5)
INR PPP: 1 (ref 0.8–1.2)
LIPASE SERPL-CCNC: 45 U/L (ref 4–60)
LYMPHOCYTES # BLD AUTO: 1.7 K/UL (ref 1–4.8)
LYMPHOCYTES NFR BLD: 19.5 % (ref 18–48)
MCH RBC QN AUTO: 27.8 PG (ref 27–31)
MCHC RBC AUTO-ENTMCNC: 31.5 G/DL (ref 32–36)
MCV RBC AUTO: 88 FL (ref 82–98)
MONOCYTES # BLD AUTO: 0.6 K/UL (ref 0.3–1)
MONOCYTES NFR BLD: 7.1 % (ref 4–15)
NEUTROPHILS # BLD AUTO: 6 K/UL (ref 1.8–7.7)
NEUTROPHILS NFR BLD: 70 % (ref 38–73)
NRBC BLD-RTO: 0 /100 WBC
PLATELET # BLD AUTO: 271 K/UL (ref 150–450)
PMV BLD AUTO: 9.7 FL (ref 9.2–12.9)
POTASSIUM SERPL-SCNC: 4 MMOL/L (ref 3.5–5.1)
PROT SERPL-MCNC: 7.3 G/DL (ref 6–8.4)
PROTHROMBIN TIME: 10.4 SEC (ref 9–12.5)
RBC # BLD AUTO: 4.61 M/UL (ref 4–5.4)
SARS-COV-2 IGG SERPL IA-ACNC: <50 AU/ML
SARS-COV-2 IGG SERPL QL IA: NEGATIVE
SODIUM SERPL-SCNC: 138 MMOL/L (ref 136–145)
URATE SERPL-MCNC: 3.9 MG/DL (ref 2.4–5.7)
WBC # BLD AUTO: 8.5 K/UL (ref 3.9–12.7)

## 2021-11-03 PROCEDURE — 85025 COMPLETE CBC W/AUTO DIFF WBC: CPT | Performed by: INTERNAL MEDICINE

## 2021-11-03 PROCEDURE — 85610 PROTHROMBIN TIME: CPT | Performed by: INTERNAL MEDICINE

## 2021-11-03 PROCEDURE — 84550 ASSAY OF BLOOD/URIC ACID: CPT | Performed by: INTERNAL MEDICINE

## 2021-11-03 PROCEDURE — 86039 ANTINUCLEAR ANTIBODIES (ANA): CPT | Performed by: INTERNAL MEDICINE

## 2021-11-03 PROCEDURE — 86769 SARS-COV-2 COVID-19 ANTIBODY: CPT | Performed by: INTERNAL MEDICINE

## 2021-11-03 PROCEDURE — 86038 ANTINUCLEAR ANTIBODIES: CPT | Performed by: INTERNAL MEDICINE

## 2021-11-03 PROCEDURE — 85730 THROMBOPLASTIN TIME PARTIAL: CPT | Performed by: INTERNAL MEDICINE

## 2021-11-03 PROCEDURE — 80053 COMPREHEN METABOLIC PANEL: CPT | Performed by: INTERNAL MEDICINE

## 2021-11-03 PROCEDURE — 83690 ASSAY OF LIPASE: CPT | Performed by: INTERNAL MEDICINE

## 2021-11-03 PROCEDURE — 86235 NUCLEAR ANTIGEN ANTIBODY: CPT | Mod: 59 | Performed by: INTERNAL MEDICINE

## 2021-11-04 LAB
ANA PATTERN 1: NORMAL
ANA PATTERN 2: NORMAL
ANA SER QL IF: POSITIVE
ANA TITER 2: NORMAL
ANA TITR SER IF: NORMAL {TITER}
ANTI SM ANTIBODY: 0.11 RATIO (ref 0–0.99)
ANTI SM/RNP ANTIBODY: 0.16 RATIO (ref 0–0.99)
ANTI-SM INTERPRETATION: NEGATIVE
ANTI-SM/RNP INTERPRETATION: NEGATIVE
ANTI-SSA ANTIBODY: 0.06 RATIO (ref 0–0.99)
ANTI-SSA ANTIBODY: 0.06 RATIO (ref 0–0.99)
ANTI-SSA INTERPRETATION: NEGATIVE
ANTI-SSA INTERPRETATION: NEGATIVE
ANTI-SSB ANTIBODY: 0.06 RATIO (ref 0–0.99)
ANTI-SSB ANTIBODY: 0.06 RATIO (ref 0–0.99)
ANTI-SSB INTERPRETATION: NEGATIVE
ANTI-SSB INTERPRETATION: NEGATIVE
DSDNA AB SER-ACNC: NORMAL [IU]/ML
DSDNA AB SER-ACNC: NORMAL [IU]/ML

## 2022-01-01 ENCOUNTER — HOSPITAL ENCOUNTER (EMERGENCY)
Facility: HOSPITAL | Age: 42
Discharge: HOME | End: 2022-01-01
Attending: EMERGENCY MEDICINE
Payer: COMMERCIAL

## 2022-01-01 VITALS
HEIGHT: 61 IN | HEART RATE: 68 BPM | BODY MASS INDEX: 24.55 KG/M2 | WEIGHT: 130 LBS | TEMPERATURE: 99 F | DIASTOLIC BLOOD PRESSURE: 80 MMHG | RESPIRATION RATE: 16 BRPM | SYSTOLIC BLOOD PRESSURE: 146 MMHG | OXYGEN SATURATION: 98 %

## 2022-01-01 DIAGNOSIS — G44.209 ACUTE NON INTRACTABLE TENSION-TYPE HEADACHE: Primary | ICD-10-CM

## 2022-01-01 DIAGNOSIS — H11.31 SCLERAL HEMORRHAGE OF RIGHT EYE: ICD-10-CM

## 2022-01-01 LAB
B-HCG UR QL: NEGATIVE
CTP QC/QA: YES

## 2022-01-01 PROCEDURE — 99284 EMERGENCY DEPT VISIT MOD MDM: CPT | Mod: 25

## 2022-01-01 PROCEDURE — 81025 URINE PREGNANCY TEST: CPT | Performed by: EMERGENCY MEDICINE

## 2022-01-01 PROCEDURE — 25000003 PHARM REV CODE 250: Performed by: EMERGENCY MEDICINE

## 2022-01-01 RX ORDER — BUTALBITAL, ACETAMINOPHEN AND CAFFEINE 50; 325; 40 MG/1; MG/1; MG/1
1 TABLET ORAL EVERY 4 HOURS PRN
Qty: 12 TABLET | Refills: 0 | Status: SHIPPED | OUTPATIENT
Start: 2022-01-01

## 2022-01-01 RX ORDER — PROPARACAINE HYDROCHLORIDE 5 MG/ML
1 SOLUTION/ DROPS OPHTHALMIC
Status: COMPLETED | OUTPATIENT
Start: 2022-01-01 | End: 2022-01-01

## 2022-01-01 RX ADMIN — PROPARACAINE HYDROCHLORIDE 1 DROP: 5 SOLUTION/ DROPS OPHTHALMIC at 09:01

## 2022-01-02 NOTE — ED PROVIDER NOTES
"Encounter Date: 1/1/2022       History     Chief Complaint   Patient presents with    Eye Problem     Reports feeling pressure behind right eye x 1 week, then waking up with right eye blood shot, reports having HTN x few days with no hx, denies blurred vision describes as "irritation"    Headache     C/o headache described as "pressure" behind right eye and right side of head, also c/o neck tightness on that side x a while off and on     The patient is a 41-year-old female who presents the emergency department with a hemorrhage to her right eye and a headache to the right side of her head.  She states she has taken her blood pressure several times this past week and her blood pressure has been elevated a few times, other times it has been normal.  She denies any blurred vision or change in vision, she denies any foreign bodies to the eye or injuries to her eye.  She denies any numbness tingling or weakness to any extremities.        Review of patient's allergies indicates:   Allergen Reactions    Shellfish containing products Shortness Of Breath and Swelling     History reviewed. No pertinent past medical history.  History reviewed. No pertinent surgical history.  History reviewed. No pertinent family history.  Social History     Tobacco Use    Smoking status: Never Smoker   Substance Use Topics    Alcohol use: No    Drug use: No     Review of Systems   Constitutional: Negative for fever.   HENT: Negative for sore throat.    Eyes: Positive for redness. Negative for photophobia, pain, discharge, itching and visual disturbance.   Respiratory: Negative for shortness of breath.    Cardiovascular: Negative for chest pain.   Gastrointestinal: Negative for nausea.   Genitourinary: Negative for dysuria.   Musculoskeletal: Negative for back pain.   Skin: Negative for rash.   Neurological: Negative for weakness.   Hematological: Does not bruise/bleed easily.       Physical Exam     Initial Vitals [01/01/22 1756]   BP " Pulse Resp Temp SpO2   122/80 78 18 98.7 °F (37.1 °C) 99 %      MAP       --         Physical Exam    Nursing note and vitals reviewed.  Constitutional: She appears well-developed and well-nourished.   HENT:   Head: Normocephalic and atraumatic.   Eyes: Pupils are equal, round, and reactive to light. Right eye exhibits no discharge. No scleral icterus.   Scleral hemorrhage to the lateral aspect of the right eye. Eye pressures are 20,19,21 to the right eye.   Neck: Neck supple.   Normal range of motion.  Pulmonary/Chest: Breath sounds normal.   Abdominal: Abdomen is soft.   Musculoskeletal:      Cervical back: Normal range of motion and neck supple.     Neurological: She is alert and oriented to person, place, and time.   Skin: Skin is warm and dry.   Psychiatric: She has a normal mood and affect. Her behavior is normal. Judgment and thought content normal.         ED Course   Procedures  Labs Reviewed   POCT URINE PREGNANCY          Imaging Results          CT Head Without Contrast (Final result)  Result time 01/01/22 21:59:12    Final result by Marcie Holcomb MD (01/01/22 21:59:12)                 Impression:      No CT evidence of acute intracranial abnormality. If the patient's headaches are sufficiently clinically suspicious, or associated with signs of elevated ICP, focal neurologic deficits, nausea, or vomiting, further evaluation with MRI can be if there are no clinical contraindications.      Electronically signed by: Marcie Holcomb MD  Date:    01/01/2022  Time:    21:59             Narrative:    EXAMINATION:  CT HEAD WITHOUT CONTRAST    CLINICAL HISTORY:  Headache, sudden, severe;    TECHNIQUE:  Low dose axial images were obtained through the head.  Coronal and sagittal reformations were also performed. Contrast was not administered.    COMPARISON:  None.    FINDINGS:  There is no acute intracranial hemorrhage, hydrocephalus, midline shift or mass effect. Gray-white matter differentiation appears  maintained. The basal cisterns are patent. The mastoid air cells and paranasal sinuses are clear of acute process. The visualized bones of the calvarium demonstrate no acute osseous abnormality.                                 Medications   proparacaine 0.5 % ophthalmic solution 1 drop (1 drop Right Eye Given 1/1/22 2100)                          Clinical Impression:   Final diagnoses:  [G44.209] Acute non intractable tension-type headache (Primary)  [H11.31] Scleral hemorrhage of right eye                 Naheed Kuhn MD  01/01/22 3251

## 2022-12-30 ENCOUNTER — LAB VISIT (OUTPATIENT)
Dept: LAB | Facility: HOSPITAL | Age: 42
End: 2022-12-30
Attending: INTERNAL MEDICINE
Payer: COMMERCIAL

## 2022-12-30 DIAGNOSIS — E78.2 MIXED HYPERLIPIDEMIA: Primary | ICD-10-CM

## 2022-12-30 DIAGNOSIS — R73.9 BLOOD GLUCOSE ELEVATED: ICD-10-CM

## 2022-12-30 DIAGNOSIS — D64.9 ANEMIA, UNSPECIFIED: ICD-10-CM

## 2022-12-30 DIAGNOSIS — Z11.3 SCREENING EXAMINATION FOR VENEREAL DISEASE: ICD-10-CM

## 2022-12-30 DIAGNOSIS — M15.9 GENERALIZED OSTEOARTHROSIS, INVOLVING MULTIPLE SITES: ICD-10-CM

## 2022-12-30 LAB
ALBUMIN SERPL BCP-MCNC: 3.4 G/DL (ref 3.5–5.2)
ALP SERPL-CCNC: 68 U/L (ref 55–135)
ALT SERPL W/O P-5'-P-CCNC: 17 U/L (ref 10–44)
ANION GAP SERPL CALC-SCNC: 7 MMOL/L (ref 8–16)
AST SERPL-CCNC: 15 U/L (ref 10–40)
BASOPHILS # BLD AUTO: 0.04 K/UL (ref 0–0.2)
BASOPHILS NFR BLD: 0.6 % (ref 0–1.9)
BILIRUB SERPL-MCNC: 0.3 MG/DL (ref 0.1–1)
BUN SERPL-MCNC: 7 MG/DL (ref 6–20)
CALCIUM SERPL-MCNC: 8.9 MG/DL (ref 8.7–10.5)
CHLORIDE SERPL-SCNC: 108 MMOL/L (ref 95–110)
CHOLEST SERPL-MCNC: 180 MG/DL (ref 120–199)
CHOLEST/HDLC SERPL: 3.7 {RATIO} (ref 2–5)
CO2 SERPL-SCNC: 25 MMOL/L (ref 23–29)
CREAT SERPL-MCNC: 0.7 MG/DL (ref 0.5–1.4)
DIFFERENTIAL METHOD: ABNORMAL
EOSINOPHIL # BLD AUTO: 0.3 K/UL (ref 0–0.5)
EOSINOPHIL NFR BLD: 3.9 % (ref 0–8)
ERYTHROCYTE [DISTWIDTH] IN BLOOD BY AUTOMATED COUNT: 13.1 % (ref 11.5–14.5)
EST. GFR  (NO RACE VARIABLE): >60 ML/MIN/1.73 M^2
ESTIMATED AVG GLUCOSE: 117 MG/DL (ref 68–131)
FERRITIN SERPL-MCNC: 22 NG/ML (ref 20–300)
GLUCOSE SERPL-MCNC: 83 MG/DL (ref 70–110)
HBA1C MFR BLD: 5.7 % (ref 4–5.6)
HCT VFR BLD AUTO: 39 % (ref 37–48.5)
HDLC SERPL-MCNC: 49 MG/DL (ref 40–75)
HDLC SERPL: 27.2 % (ref 20–50)
HGB BLD-MCNC: 12.1 G/DL (ref 12–16)
HIV 1+2 AB+HIV1 P24 AG SERPL QL IA: NORMAL
IMM GRANULOCYTES # BLD AUTO: 0.02 K/UL (ref 0–0.04)
IMM GRANULOCYTES NFR BLD AUTO: 0.3 % (ref 0–0.5)
IRON SERPL-MCNC: 45 UG/DL (ref 30–160)
IRON SERPL-MCNC: 45 UG/DL (ref 30–160)
LDLC SERPL CALC-MCNC: 116.4 MG/DL (ref 63–159)
LYMPHOCYTES # BLD AUTO: 1.6 K/UL (ref 1–4.8)
LYMPHOCYTES NFR BLD: 24.5 % (ref 18–48)
MCH RBC QN AUTO: 27.1 PG (ref 27–31)
MCHC RBC AUTO-ENTMCNC: 31 G/DL (ref 32–36)
MCV RBC AUTO: 87 FL (ref 82–98)
MONOCYTES # BLD AUTO: 0.5 K/UL (ref 0.3–1)
MONOCYTES NFR BLD: 7.3 % (ref 4–15)
NEUTROPHILS # BLD AUTO: 4.3 K/UL (ref 1.8–7.7)
NEUTROPHILS NFR BLD: 63.4 % (ref 38–73)
NONHDLC SERPL-MCNC: 131 MG/DL
NRBC BLD-RTO: 0 /100 WBC
PLATELET # BLD AUTO: 279 K/UL (ref 150–450)
PMV BLD AUTO: 9.4 FL (ref 9.2–12.9)
POTASSIUM SERPL-SCNC: 4.6 MMOL/L (ref 3.5–5.1)
PROT SERPL-MCNC: 6.8 G/DL (ref 6–8.4)
RBC # BLD AUTO: 4.46 M/UL (ref 4–5.4)
SATURATED IRON: 13 % (ref 20–50)
SODIUM SERPL-SCNC: 140 MMOL/L (ref 136–145)
T4 FREE SERPL-MCNC: 1 NG/DL (ref 0.71–1.51)
TOTAL IRON BINDING CAPACITY: 355 UG/DL (ref 250–450)
TRANSFERRIN SERPL-MCNC: 240 MG/DL (ref 200–375)
TRIGL SERPL-MCNC: 73 MG/DL (ref 30–150)
TSH SERPL DL<=0.005 MIU/L-ACNC: 1.64 UIU/ML (ref 0.4–4)
VIT B12 SERPL-MCNC: 699 PG/ML (ref 210–950)
WBC # BLD AUTO: 6.7 K/UL (ref 3.9–12.7)

## 2022-12-30 PROCEDURE — 86038 ANTINUCLEAR ANTIBODIES: CPT | Performed by: INTERNAL MEDICINE

## 2022-12-30 PROCEDURE — 85025 COMPLETE CBC W/AUTO DIFF WBC: CPT | Performed by: INTERNAL MEDICINE

## 2022-12-30 PROCEDURE — 36415 COLL VENOUS BLD VENIPUNCTURE: CPT | Performed by: INTERNAL MEDICINE

## 2022-12-30 PROCEDURE — 86225 DNA ANTIBODY NATIVE: CPT | Performed by: INTERNAL MEDICINE

## 2022-12-30 PROCEDURE — 82728 ASSAY OF FERRITIN: CPT | Performed by: INTERNAL MEDICINE

## 2022-12-30 PROCEDURE — 86695 HERPES SIMPLEX TYPE 1 TEST: CPT | Performed by: INTERNAL MEDICINE

## 2022-12-30 PROCEDURE — 86592 SYPHILIS TEST NON-TREP QUAL: CPT | Performed by: INTERNAL MEDICINE

## 2022-12-30 PROCEDURE — 87389 HIV-1 AG W/HIV-1&-2 AB AG IA: CPT | Performed by: INTERNAL MEDICINE

## 2022-12-30 PROCEDURE — 86039 ANTINUCLEAR ANTIBODIES (ANA): CPT | Performed by: INTERNAL MEDICINE

## 2022-12-30 PROCEDURE — 80061 LIPID PANEL: CPT | Performed by: INTERNAL MEDICINE

## 2022-12-30 PROCEDURE — 84466 ASSAY OF TRANSFERRIN: CPT | Performed by: INTERNAL MEDICINE

## 2022-12-30 PROCEDURE — 84439 ASSAY OF FREE THYROXINE: CPT | Performed by: INTERNAL MEDICINE

## 2022-12-30 PROCEDURE — 84443 ASSAY THYROID STIM HORMONE: CPT | Performed by: INTERNAL MEDICINE

## 2022-12-30 PROCEDURE — 83036 HEMOGLOBIN GLYCOSYLATED A1C: CPT | Performed by: INTERNAL MEDICINE

## 2022-12-30 PROCEDURE — 82607 VITAMIN B-12: CPT | Performed by: INTERNAL MEDICINE

## 2022-12-30 PROCEDURE — 80053 COMPREHEN METABOLIC PANEL: CPT | Performed by: INTERNAL MEDICINE

## 2022-12-31 LAB — RPR SER QL: NORMAL

## 2023-01-03 LAB
ANA PATTERN 1: NORMAL
ANA SER QL IF: POSITIVE
ANA TITR SER IF: NORMAL {TITER}

## 2023-01-04 LAB
ANTI SM ANTIBODY: 0.08 RATIO (ref 0–0.99)
ANTI SM/RNP ANTIBODY: 0.18 RATIO (ref 0–0.99)
ANTI-SM INTERPRETATION: NEGATIVE
ANTI-SM/RNP INTERPRETATION: NEGATIVE
ANTI-SSA ANTIBODY: 0.06 RATIO (ref 0–0.99)
ANTI-SSA INTERPRETATION: NEGATIVE
ANTI-SSB ANTIBODY: 0.04 RATIO (ref 0–0.99)
ANTI-SSB INTERPRETATION: NEGATIVE
DSDNA AB SER-ACNC: NORMAL [IU]/ML

## 2023-01-05 LAB
HSV1 GG IGG SER-ACNC: 26.7 IV
HSV2 GG IGG SER-ACNC: 0.05 IV

## 2023-02-27 ENCOUNTER — HOSPITAL ENCOUNTER (EMERGENCY)
Facility: HOSPITAL | Age: 43
Discharge: HOME | End: 2023-02-28
Attending: STUDENT IN AN ORGANIZED HEALTH CARE EDUCATION/TRAINING PROGRAM
Payer: COMMERCIAL

## 2023-02-27 VITALS
BODY MASS INDEX: 27.94 KG/M2 | RESPIRATION RATE: 16 BRPM | SYSTOLIC BLOOD PRESSURE: 128 MMHG | TEMPERATURE: 98 F | HEIGHT: 61 IN | OXYGEN SATURATION: 100 % | DIASTOLIC BLOOD PRESSURE: 76 MMHG | WEIGHT: 148 LBS | HEART RATE: 82 BPM

## 2023-02-27 DIAGNOSIS — R52 PAIN: ICD-10-CM

## 2023-02-27 DIAGNOSIS — S93.402A SPRAIN OF LEFT ANKLE, UNSPECIFIED LIGAMENT, INITIAL ENCOUNTER: Primary | ICD-10-CM

## 2023-02-27 LAB
B-HCG UR QL: NEGATIVE
CTP QC/QA: YES

## 2023-02-27 PROCEDURE — 99284 EMERGENCY DEPT VISIT MOD MDM: CPT

## 2023-02-27 PROCEDURE — 81025 URINE PREGNANCY TEST: CPT | Performed by: STUDENT IN AN ORGANIZED HEALTH CARE EDUCATION/TRAINING PROGRAM

## 2023-02-27 NOTE — Clinical Note
"Babita"Carmelita Grewal was seen and treated in our emergency department on 2/27/2023.  She may return to work on 03/05/2023.       If you have any questions or concerns, please don't hesitate to call.      Ralf Ohara MD"

## 2023-02-27 NOTE — Clinical Note
"Babita"Carmelita Grewal was seen and treated in our emergency department on 2/27/2023.  She may return to work on 03/03/2023.       If you have any questions or concerns, please don't hesitate to call.      Ralf Ohara MD" OB/GYN

## 2023-02-28 PROCEDURE — 25000003 PHARM REV CODE 250: Performed by: STUDENT IN AN ORGANIZED HEALTH CARE EDUCATION/TRAINING PROGRAM

## 2023-02-28 RX ORDER — METHOCARBAMOL 500 MG/1
1000 TABLET, FILM COATED ORAL 3 TIMES DAILY
Qty: 30 TABLET | Refills: 0 | Status: SHIPPED | OUTPATIENT
Start: 2023-02-28 | End: 2023-03-05

## 2023-02-28 RX ORDER — IBUPROFEN 600 MG/1
600 TABLET ORAL
Status: COMPLETED | OUTPATIENT
Start: 2023-02-28 | End: 2023-02-28

## 2023-02-28 RX ORDER — IBUPROFEN 600 MG/1
600 TABLET ORAL EVERY 6 HOURS PRN
Qty: 20 TABLET | Refills: 0 | Status: SHIPPED | OUTPATIENT
Start: 2023-02-28

## 2023-02-28 RX ORDER — ACETAMINOPHEN 500 MG
500 TABLET ORAL EVERY 6 HOURS PRN
Qty: 20 TABLET | Refills: 0 | Status: SHIPPED | OUTPATIENT
Start: 2023-02-28

## 2023-02-28 RX ORDER — ACETAMINOPHEN 500 MG
1000 TABLET ORAL
Status: COMPLETED | OUTPATIENT
Start: 2023-02-28 | End: 2023-02-28

## 2023-02-28 RX ADMIN — ACETAMINOPHEN 1000 MG: 500 TABLET ORAL at 12:02

## 2023-02-28 RX ADMIN — IBUPROFEN 600 MG: 600 TABLET, FILM COATED ORAL at 12:02

## 2023-02-28 NOTE — ED NOTES
Patient reports twisting left ankle this morning.  Reports pain to top of foot and lateral ankle area on left side.  No obvious deformity.  No redness.  No heat.  Ice pack applied.  Patient ambulatory throughout the day with increased pain noted.     Pain:  Rated 10/10.     Psychosocial:  Patient is calm and cooperative.  Patients insight and judgement are appropriate to situation.  Appears clean, well maintained, with clothing appropriate to environment.  No evidence of delusions, hallucinations, or psychosis.     Neuro:  Eyes open spontaneously.  Awake, alert, oriented x 4.  Speech clear and appropriate.  Tolerating saliva secretions well.  Able to follow commands, demonstrating ability to actively and appropriately communicate within context of current conversation.  Symmetrical facial muscles.  Moving all extremities well with no noted weakness.  Adequate muscle tone present.    Movement is purposeful.        Airway:  Bilateral chest rise and fall.  RR regular and non-labored.         Circulatory:  Skin warm, dry, and pink.  Capillary refill/skin blanching less than 3 seconds to distal of 4 extremities.

## 2023-02-28 NOTE — DISCHARGE INSTRUCTIONS
Thank you for coming to our Emergency Department today. It is important to remember that some problems are difficult to diagnose and may not be found during your first visit. Be sure to follow up with your primary care doctor and review any labs/imaging that was performed with them. If you do not have a primary care doctor, you may contact the one listed on your discharge paperwork or you may also call the [facility] Appointment Desk at [phone] to schedule an appointment with one.     All medications may potentially have side effects and it is impossible to predict which medications may give you side effects. If you feel that you are having a negative effect of any medication you should immediately stop taking them and seek medical attention.    Return to the ER with any questions/concerns, new/concerning symptoms, worsening or failure to improve. Do not drive or make any important decisions for 24 hours if you have received any pain medications, sedatives or mood altering drugs during your ER visit.

## 2023-02-28 NOTE — ED PROVIDER NOTES
Encounter Date: 2/27/2023       History     Chief Complaint   Patient presents with    Ankle Injury     Patient missed a step this morning, rolled ankle, heard a pop. L lateral ankle swelling and pain. Pain radiating up shin. Able to bear weight. Did not take any medication today.  CMS intact.      42-year-old female no significant past medical history presents with left ankle pain and swelling after tripping and rolling her ankle.  Patient reports she 1st at this morning and heard a pop and pain in the region.  She reports she was able to bear weight but as the day progresses and swelling and pain worsened.  She denies any numbness or tingling.  Denies taking any medications prior to arrival .  Denies any head injury    The history is provided by the patient.   Review of patient's allergies indicates:   Allergen Reactions    Shellfish containing products Shortness Of Breath and Swelling     No past medical history on file.  No past surgical history on file.  No family history on file.  Social History     Tobacco Use    Smoking status: Never   Substance Use Topics    Alcohol use: No    Drug use: No     Review of Systems   Constitutional:  Negative for chills and fever.   HENT:  Negative for congestion and rhinorrhea.    Eyes:  Negative for pain.   Respiratory:  Negative for cough and shortness of breath.    Cardiovascular:  Negative for chest pain and leg swelling.   Gastrointestinal:  Negative for abdominal pain, nausea and vomiting.   Endocrine: Negative for polyuria.   Genitourinary:  Negative for dysuria and hematuria.   Musculoskeletal:  Positive for joint swelling. Negative for gait problem and neck pain.   Skin:  Negative for rash.   Allergic/Immunologic: Negative for immunocompromised state.   Neurological:  Negative for weakness and headaches.     Physical Exam     Initial Vitals [02/27/23 2149]   BP Pulse Resp Temp SpO2   128/76 82 16 98.1 °F (36.7 °C) 100 %      MAP       --         Physical  Exam    Nursing note and vitals reviewed.  Constitutional: She appears well-developed and well-nourished. She is not diaphoretic. No distress.   HENT:   Head: Normocephalic and atraumatic.   Eyes: Conjunctivae and EOM are normal. Pupils are equal, round, and reactive to light.   Neck:   Normal range of motion.  Cardiovascular:  Regular rhythm.           Pulmonary/Chest: Breath sounds normal. No respiratory distress.   Abdominal: Abdomen is soft. Bowel sounds are normal. She exhibits no distension. There is no abdominal tenderness. There is no rebound and no guarding.   Musculoskeletal:         General: Normal range of motion.      Cervical back: Normal range of motion.      Left lower leg: Tenderness present.      Right ankle: Normal.      Left ankle: Swelling present. Tenderness present.     Lymphadenopathy:     She has no cervical adenopathy.   Neurological: She is alert and oriented to person, place, and time.   Skin: Skin is warm. Capillary refill takes less than 2 seconds.   Psychiatric: She has a normal mood and affect. Her behavior is normal.       ED Course   Procedures  Labs Reviewed   POCT URINE PREGNANCY          Imaging Results              X-Ray Ankle Complete Left (Final result)  Result time 02/27/23 23:56:47      Final result by Padilla Kuhn DO (02/27/23 23:56:47)                   Impression:      No acute osseous abnormality of the left knee, tib-fib, or ankle.      Electronically signed by: Padilla Kuhn  Date:    02/27/2023  Time:    23:56               Narrative:    EXAMINATION:  XR TIBIA FIBULA 2 VIEW LEFT; XR ANKLE COMPLETE 3 VIEW LEFT; XR KNEE 1 OR 2 VIEW LEFT    CLINICAL HISTORY:  pain;  Pain, unspecified    TECHNIQUE:  AP and lateral radiographs of the left knee.    AP and lateral radiographs of the left tibia and fibula.    AP, oblique, lateral radiographs of the left ankle.    COMPARISON:  None.    FINDINGS:  Left knee: There is no acute fracture or dislocation. Alignment is normal.  Joint spaces are preserved. No joint effusion.    Left hip: No acute fracture or dislocation.  Alignment is normal.    Left ankle: No acute fracture or dislocation. Alignment is normal. The ankle mortise is congruent. The talar dome is intact. Joint spaces are preserved. No effusion.                                       X-Ray Tibia Fibula 2 View Left (Final result)  Result time 02/27/23 23:56:47      Final result by Padilla Kuhn DO (02/27/23 23:56:47)                   Impression:      No acute osseous abnormality of the left knee, tib-fib, or ankle.      Electronically signed by: Padilla Kuhn  Date:    02/27/2023  Time:    23:56               Narrative:    EXAMINATION:  XR TIBIA FIBULA 2 VIEW LEFT; XR ANKLE COMPLETE 3 VIEW LEFT; XR KNEE 1 OR 2 VIEW LEFT    CLINICAL HISTORY:  pain;  Pain, unspecified    TECHNIQUE:  AP and lateral radiographs of the left knee.    AP and lateral radiographs of the left tibia and fibula.    AP, oblique, lateral radiographs of the left ankle.    COMPARISON:  None.    FINDINGS:  Left knee: There is no acute fracture or dislocation. Alignment is normal. Joint spaces are preserved. No joint effusion.    Left hip: No acute fracture or dislocation.  Alignment is normal.    Left ankle: No acute fracture or dislocation. Alignment is normal. The ankle mortise is congruent. The talar dome is intact. Joint spaces are preserved. No effusion.                                       X-Ray Knee 1 or 2 View Left (Final result)  Result time 02/27/23 23:56:47      Final result by Padilla Kuhn DO (02/27/23 23:56:47)                   Impression:      No acute osseous abnormality of the left knee, tib-fib, or ankle.      Electronically signed by: Padilla Kuhn  Date:    02/27/2023  Time:    23:56               Narrative:    EXAMINATION:  XR TIBIA FIBULA 2 VIEW LEFT; XR ANKLE COMPLETE 3 VIEW LEFT; XR KNEE 1 OR 2 VIEW LEFT    CLINICAL HISTORY:  pain;  Pain, unspecified    TECHNIQUE:  AP and  lateral radiographs of the left knee.    AP and lateral radiographs of the left tibia and fibula.    AP, oblique, lateral radiographs of the left ankle.    COMPARISON:  None.    FINDINGS:  Left knee: There is no acute fracture or dislocation. Alignment is normal. Joint spaces are preserved. No joint effusion.    Left hip: No acute fracture or dislocation.  Alignment is normal.    Left ankle: No acute fracture or dislocation. Alignment is normal. The ankle mortise is congruent. The talar dome is intact. Joint spaces are preserved. No effusion.                                       Medications   acetaminophen tablet 1,000 mg (1,000 mg Oral Given 2/28/23 0051)   ibuprofen tablet 600 mg (600 mg Oral Given 2/28/23 0051)     Medical Decision Making:   History:   Old Medical Records: I decided to obtain old medical records.  Initial Assessment:   2-year-old female with no significant past medical history presents with left ankle pain.  Patient no acute distress exam notable for swelling to the lateral aspect of left ankle.  Range of motion intact but mildly limited secondary to pain.  Neurovascularly intact distally.  X-ray of the ankle, tib-fib, knee without any acute osseous abnormalities.  Reassuring patient is able to bear weight on the extremity.  Recommended elevation, eyes, cyst.  Prescription for Tylenol ibuprofen Robaxin given..   Clinical Tests:   Radiological Study: Ordered and Reviewed           ED Course as of 02/28/23 0228   Tue Feb 28, 2023   0025 Pt is currently stable for discharge. I see no indication of an emergent process beyond that addressed during our encounter but have duly counseled the patient/family regarding the need for prompt follow-up as well as the indications that should prompt immediate return to the emergency room should new or worrisome developments occur. The patient/family has been provided with verbal and printed direction regarding our final diagnosis(es) as well as instructions  regarding use of OTC and/or Rx medications intended to manage the patient's aforementioned conditions. The patient/family verbalized an understanding. The patient/family is asked if there are any questions or concerns. We discuss the case, until all issues are addressed to the patient/family's satisfaction. Patient/family understands and is agreeable to the plan.   [AS]      ED Course User Index  [AS] Ralf Ohara MD          This dictation has been generated using M-Modal Fluency Direct dictation; some phonetic errors may occur.          Clinical Impression:   Final diagnoses:  [R52] Pain  [S93.402A] Sprain of left ankle, unspecified ligament, initial encounter (Primary)        ED Disposition Condition    Discharge Stable          ED Prescriptions       Medication Sig Dispense Start Date End Date Auth. Provider    ibuprofen (ADVIL,MOTRIN) 600 MG tablet Take 1 tablet (600 mg total) by mouth every 6 (six) hours as needed for Pain. 20 tablet 2/28/2023 -- Ralf Ohara MD    acetaminophen (TYLENOL) 500 MG tablet Take 1 tablet (500 mg total) by mouth every 6 (six) hours as needed for Pain. 20 tablet 2/28/2023 -- Ralf Ohara MD    methocarbamoL (ROBAXIN) 500 MG Tab Take 2 tablets (1,000 mg total) by mouth 3 (three) times daily. for 5 days 30 tablet 2/28/2023 3/5/2023 Ralf Ohara MD          Follow-up Information       Follow up With Specialties Details Why Contact Info    Jordan Michaels MD Internal Medicine Call  for reassesment [address] [place] [address] [phone]      [facility] Emergency Dept Emergency Medicine  If symptoms worsen [address] [facility] [address] [phone]             Ralf Ohara MD  02/28/23 2372

## 2023-02-28 NOTE — ED NOTES
Patient received for discharge.  Patient is awake, alert, and oriented x 4.  Speech clear and appropriate.  RR regular and non labored.  Skin W/D/P.  Given written and verbal discharge instruction, with verbal understanding.  Patient given the opportunity to ask questions, with answers to meet needs.  No complaints or noted concerns.

## 2023-03-01 ENCOUNTER — OFFICE VISIT (OUTPATIENT)
Dept: CARDIOLOGY | Facility: CLINIC | Age: 43
End: 2023-03-01
Payer: COMMERCIAL

## 2023-03-01 VITALS
HEIGHT: 61 IN | OXYGEN SATURATION: 97 % | BODY MASS INDEX: 28.13 KG/M2 | SYSTOLIC BLOOD PRESSURE: 113 MMHG | DIASTOLIC BLOOD PRESSURE: 79 MMHG | HEART RATE: 74 BPM | WEIGHT: 149 LBS

## 2023-03-01 DIAGNOSIS — R07.89 OTHER CHEST PAIN: Primary | ICD-10-CM

## 2023-03-01 PROCEDURE — 99204 PR OFFICE/OUTPT VISIT, NEW, LEVL IV, 45-59 MIN: ICD-10-PCS | Mod: S$GLB,,, | Performed by: INTERNAL MEDICINE

## 2023-03-01 PROCEDURE — 3074F PR MOST RECENT SYSTOLIC BLOOD PRESSURE < 130 MM HG: ICD-10-PCS | Mod: CPTII,S$GLB,, | Performed by: INTERNAL MEDICINE

## 2023-03-01 PROCEDURE — 99999 PR PBB SHADOW E&M-EST. PATIENT-LVL III: CPT | Mod: PBBFAC,,, | Performed by: INTERNAL MEDICINE

## 2023-03-01 PROCEDURE — 3078F DIAST BP <80 MM HG: CPT | Mod: CPTII,S$GLB,, | Performed by: INTERNAL MEDICINE

## 2023-03-01 PROCEDURE — 1160F RVW MEDS BY RX/DR IN RCRD: CPT | Mod: CPTII,S$GLB,, | Performed by: INTERNAL MEDICINE

## 2023-03-01 PROCEDURE — 3078F PR MOST RECENT DIASTOLIC BLOOD PRESSURE < 80 MM HG: ICD-10-PCS | Mod: CPTII,S$GLB,, | Performed by: INTERNAL MEDICINE

## 2023-03-01 PROCEDURE — 99204 OFFICE O/P NEW MOD 45 MIN: CPT | Mod: S$GLB,,, | Performed by: INTERNAL MEDICINE

## 2023-03-01 PROCEDURE — 1160F PR REVIEW ALL MEDS BY PRESCRIBER/CLIN PHARMACIST DOCUMENTED: ICD-10-PCS | Mod: CPTII,S$GLB,, | Performed by: INTERNAL MEDICINE

## 2023-03-01 PROCEDURE — 3008F PR BODY MASS INDEX (BMI) DOCUMENTED: ICD-10-PCS | Mod: CPTII,S$GLB,, | Performed by: INTERNAL MEDICINE

## 2023-03-01 PROCEDURE — 3008F BODY MASS INDEX DOCD: CPT | Mod: CPTII,S$GLB,, | Performed by: INTERNAL MEDICINE

## 2023-03-01 PROCEDURE — 99999 PR PBB SHADOW E&M-EST. PATIENT-LVL III: ICD-10-PCS | Mod: PBBFAC,,, | Performed by: INTERNAL MEDICINE

## 2023-03-01 PROCEDURE — 1159F PR MEDICATION LIST DOCUMENTED IN MEDICAL RECORD: ICD-10-PCS | Mod: CPTII,S$GLB,, | Performed by: INTERNAL MEDICINE

## 2023-03-01 PROCEDURE — 1159F MED LIST DOCD IN RCRD: CPT | Mod: CPTII,S$GLB,, | Performed by: INTERNAL MEDICINE

## 2023-03-01 PROCEDURE — 3074F SYST BP LT 130 MM HG: CPT | Mod: CPTII,S$GLB,, | Performed by: INTERNAL MEDICINE

## 2023-03-01 NOTE — PROGRESS NOTES
Subjective:   @Patient ID:  Babita Grewal is a 42 y.o. female who presents for evaluation of shortness of breath     HPI:   Here for initial evaluation   C/o b/l arm numbness for the last few months. Also reported occasional chest tightness that radiates to the back.    Recently went to the ER for ankle sprain. She was started on ms relaxant which helped her arm symptoms as well. She reports neck pain   No tobacco abuse    FH significant for HTN    Prior cardiovascular  Hx  --------------------------------      - EKG 2017 SR, non specific st-t changes              Patient Active Problem List    Diagnosis Date Noted    Normal delivery 2014    Decreased fetal movement 2014           Right Arm BP - Sittin/77  Left Arm BP - Sittin/79        LAST HbA1c  Lab Results   Component Value Date    HGBA1C 5.7 (H) 2022       Lipid panel  Lab Results   Component Value Date    CHOL 180 2022     Lab Results   Component Value Date    HDL 49 2022     Lab Results   Component Value Date    LDLCALC 116.4 2022     Lab Results   Component Value Date    TRIG 73 2022     Lab Results   Component Value Date    CHOLHDL 27.2 2022            Review of Systems   Constitutional: Negative for chills and fever.   HENT:  Negative for hearing loss and nosebleeds.    Eyes:  Negative for blurred vision.   Cardiovascular:         As in HPI    Respiratory:  Negative for hemoptysis and shortness of breath.    Hematologic/Lymphatic: Negative for bleeding problem.   Skin:  Negative for itching.   Musculoskeletal:         As in HPI    Gastrointestinal:  Negative for abdominal pain and hematochezia.   Genitourinary:  Negative for hematuria.   Neurological:  Negative for dizziness and loss of balance.   Psychiatric/Behavioral:  Negative for altered mental status and depression.      Objective:   Physical Exam  Constitutional:       Appearance: She is well-developed.   HENT:      Head: Normocephalic  and atraumatic.   Eyes:      Conjunctiva/sclera: Conjunctivae normal.   Neck:      Vascular: No carotid bruit or JVD.   Cardiovascular:      Rate and Rhythm: Normal rate and regular rhythm.      Pulses:           Carotid pulses are 2+ on the right side and 2+ on the left side.       Radial pulses are 2+ on the right side and 2+ on the left side.      Heart sounds: Murmur: possible faint systolic murmur.     No friction rub. No gallop.   Pulmonary:      Effort: Pulmonary effort is normal. No respiratory distress.      Breath sounds: Normal breath sounds. No stridor. No wheezing.   Musculoskeletal:      Cervical back: Neck supple.   Skin:     General: Skin is warm and dry.   Neurological:      Mental Status: She is alert and oriented to person, place, and time.   Psychiatric:         Behavior: Behavior normal.       Assessment:     1. Other chest pain        Plan:     - Symptoms are atypical.   - Definitely there is msk component   - Will proceed with stress Echo for evaluation and risk stratification     I spent 5-10 minutes asking, assessing, assisting, arranging and advising heart healthy diet improvements. This included low-salt meals, portion control and health food alternatives. I also encourage 30 minutes of moderate exercise 3-4x a week.      Pertinent cardiac images and EKG reviewed independently.    Continue with current medical plan and lifestyle changes.  Return sooner for concerns or questions. If symptoms persist go to the ED  I have reviewed all pertinent data including patient's medical history in detail and updated the computerized patient record.     Orders Placed This Encounter   Procedures    Stress Echo Which stress agent will be used? Treadmill Exercise; Color Flow Doppler? No     Standing Status:   Future     Standing Expiration Date:   3/1/2024     Order Specific Question:   Which stress agent will be used?     Answer:   Treadmill Exercise     Order Specific Question:   Color Flow Doppler?      Answer:   No     Order Specific Question:   Release to patient     Answer:   Immediate       Follow up as scheduled.     She expressed verbal understanding and agreed with the plan    Patient's Medications   New Prescriptions    No medications on file   Previous Medications    ACETAMINOPHEN (TYLENOL) 500 MG TABLET    Take 1 tablet (500 mg total) by mouth every 6 (six) hours as needed for Pain.    ASPIRIN (ECOTRIN) 81 MG EC TABLET    Take 81 mg by mouth once daily.    BUTALBITAL-ACETAMINOPHEN-CAFFEINE -40 MG (FIORICET, ESGIC) -40 MG PER TABLET    Take 1 tablet by mouth every 4 (four) hours as needed for Headaches.    EMTRICITABINE-TENOFOVIR 200-300 MG (TRUVADA) 200-300 MG TAB    Take 1 tablet by mouth once daily.    IBUPROFEN (ADVIL,MOTRIN) 600 MG TABLET    Take 1 tablet (600 mg total) by mouth every 6 (six) hours as needed for Pain.    METHOCARBAMOL (ROBAXIN) 500 MG TAB    Take 2 tablets (1,000 mg total) by mouth 3 (three) times daily. for 5 days    NAPROXEN (NAPROSYN) 500 MG TABLET    Take 1 tablet (500 mg total) by mouth 2 (two) times daily with meals.    OXYCODONE-ACETAMINOPHEN (PERCOCET) 5-325 MG PER TABLET    Take 1 tablet by mouth every 6 (six) hours as needed for Pain.    RALTEGRAVIR (ISENTRESS) 400 MG TABLET    Take 1 tablet (400 mg total) by mouth 2 (two) times daily.   Modified Medications    No medications on file   Discontinued Medications    No medications on file

## 2023-03-10 ENCOUNTER — HOSPITAL ENCOUNTER (OUTPATIENT)
Dept: CARDIOLOGY | Facility: HOSPITAL | Age: 43
Discharge: HOME | End: 2023-03-10
Attending: INTERNAL MEDICINE
Payer: COMMERCIAL

## 2023-03-10 VITALS — BODY MASS INDEX: 28.13 KG/M2 | HEIGHT: 61 IN | WEIGHT: 149 LBS

## 2023-03-10 DIAGNOSIS — R07.89 OTHER CHEST PAIN: ICD-10-CM

## 2023-03-10 LAB
BSA FOR ECHO PROCEDURE: 1.71 M2
CV STRESS BASE HR: 177 BPM
DIASTOLIC BLOOD PRESSURE: 68 MMHG
EJECTION FRACTION: 60 %
OHS CV CPX 1 MINUTE RECOVERY HEART RATE: 110 BPM
OHS CV CPX 85 PERCENT MAX PREDICTED HEART RATE MALE: 143
OHS CV CPX ESTIMATED METS: 8
OHS CV CPX MAX PREDICTED HEART RATE: 168
OHS CV CPX PATIENT IS FEMALE: 1
OHS CV CPX PATIENT IS MALE: 0
OHS CV CPX PEAK DIASTOLIC BLOOD PRESSURE: 59 MMHG
OHS CV CPX PEAK HEAR RATE: 162 BPM
OHS CV CPX PEAK RATE PRESSURE PRODUCT: NORMAL
OHS CV CPX PEAK SYSTOLIC BLOOD PRESSURE: 158 MMHG
OHS CV CPX PERCENT MAX PREDICTED HEART RATE ACHIEVED: 96
OHS CV CPX RATE PRESSURE PRODUCT PRESENTING: NORMAL
STRESS ECHO POST EXERCISE DUR MIN: 6 MINUTES
STRESS ECHO POST EXERCISE DUR SEC: 53 SECONDS
SYSTOLIC BLOOD PRESSURE: 126 MMHG

## 2023-03-10 PROCEDURE — 93351 STRESS ECHO (CUPID ONLY): ICD-10-PCS | Mod: 26,,, | Performed by: INTERNAL MEDICINE

## 2023-03-10 PROCEDURE — 93351 STRESS TTE COMPLETE: CPT | Mod: 26,,, | Performed by: INTERNAL MEDICINE

## 2023-03-10 PROCEDURE — 93351 STRESS TTE COMPLETE: CPT

## 2023-03-10 NOTE — PROGRESS NOTES
Stress test result is ok. No major issues. Normal heart pumping function.     Sincerely,  Soren Sanchez MD.   Interventional Cardiologist  [facility], [place]

## 2023-05-23 ENCOUNTER — TELEPHONE (OUTPATIENT)
Dept: CARDIOLOGY | Facility: CLINIC | Age: 43
End: 2023-05-23
Payer: COMMERCIAL

## 2023-05-23 NOTE — TELEPHONE ENCOUNTER
Attempted to reach in regards to message.    Review stress test results that was routed through her portal

## 2023-05-23 NOTE — TELEPHONE ENCOUNTER
----- Message from Josh Chand sent at 5/22/2023  1:31 PM CDT -----  Contact: pt   .Type:  Test Results    Who Called: pt  Name of Test (Lab/Mammo/Etc):  stress test  Date of Test: 03/10/2023  Ordering Provider:   Where the test was performed:  [facility]  Would the patient rather a call back or a response via [facility]?  Call back  Best Call Back Number: [phone]  Additional Information:   Pt. Is requesting a call back to discuss her test results.  She has some questions for the office.

## 2023-09-27 NOTE — ED NOTES
Pt presents to ed with c/o R sided headache and pressure since yest. Pt also presents with reddened R eye. Denies blurry vision or vision changes. Denies N/V. PT also reports R neck and R shoulder pain. Denies trauma. No deformities noted. resp even and unlabored. Will continue to monitor.   Render In Strict Bullet Format?: No Detail Level: Zone Continue Regimen: Minocycline 100mg one tab PO QD w/ food and water (patient stopped taking last month) Modify Regimen: Clobetasol apply to affected area on neck BID Monday, Wednesday, Friday.

## 2024-02-21 ENCOUNTER — LAB VISIT (OUTPATIENT)
Dept: LAB | Facility: HOSPITAL | Age: 44
End: 2024-02-21
Attending: INTERNAL MEDICINE
Payer: MEDICAID

## 2024-02-21 DIAGNOSIS — Z00.00 ROUTINE GENERAL MEDICAL EXAMINATION AT A HEALTH CARE FACILITY: ICD-10-CM

## 2024-02-21 DIAGNOSIS — D64.9 ANEMIA, UNSPECIFIED: Primary | ICD-10-CM

## 2024-02-21 DIAGNOSIS — M15.9 GENERALIZED OSTEOARTHROSIS, INVOLVING MULTIPLE SITES: ICD-10-CM

## 2024-02-21 DIAGNOSIS — N92.1 METRORRHAGIA: ICD-10-CM

## 2024-02-21 DIAGNOSIS — E11.9 DIABETES MELLITUS WITHOUT COMPLICATION: ICD-10-CM

## 2024-02-21 LAB
ALBUMIN SERPL BCP-MCNC: 3.8 G/DL (ref 3.5–5.2)
ALP SERPL-CCNC: 82 U/L (ref 55–135)
ALT SERPL W/O P-5'-P-CCNC: 18 U/L (ref 10–44)
ANION GAP SERPL CALC-SCNC: 9 MMOL/L (ref 8–16)
AST SERPL-CCNC: 18 U/L (ref 10–40)
BASOPHILS # BLD AUTO: 0.04 K/UL (ref 0–0.2)
BASOPHILS NFR BLD: 0.5 % (ref 0–1.9)
BILIRUB SERPL-MCNC: 0.4 MG/DL (ref 0.1–1)
BUN SERPL-MCNC: 7 MG/DL (ref 6–20)
CALCIUM SERPL-MCNC: 9.5 MG/DL (ref 8.7–10.5)
CCP AB SER IA-ACNC: <0.5 U/ML
CHLORIDE SERPL-SCNC: 105 MMOL/L (ref 95–110)
CHOLEST SERPL-MCNC: 201 MG/DL (ref 120–199)
CHOLEST/HDLC SERPL: 3.9 {RATIO} (ref 2–5)
CO2 SERPL-SCNC: 25 MMOL/L (ref 23–29)
CREAT SERPL-MCNC: 0.8 MG/DL (ref 0.5–1.4)
DIFFERENTIAL METHOD BLD: NORMAL
EOSINOPHIL # BLD AUTO: 0.2 K/UL (ref 0–0.5)
EOSINOPHIL NFR BLD: 2.3 % (ref 0–8)
ERYTHROCYTE [DISTWIDTH] IN BLOOD BY AUTOMATED COUNT: 13.3 % (ref 11.5–14.5)
EST. GFR  (NO RACE VARIABLE): >60 ML/MIN/1.73 M^2
ESTIMATED AVG GLUCOSE: 111 MG/DL (ref 68–131)
FERRITIN SERPL-MCNC: 33 NG/ML (ref 20–300)
FSH SERPL-ACNC: 5.5 MIU/ML
GLUCOSE SERPL-MCNC: 73 MG/DL (ref 70–110)
HBA1C MFR BLD: 5.5 % (ref 4–5.6)
HCT VFR BLD AUTO: 39.8 % (ref 37–48.5)
HDLC SERPL-MCNC: 52 MG/DL (ref 40–75)
HDLC SERPL: 25.9 % (ref 20–50)
HGB BLD-MCNC: 12.9 G/DL (ref 12–16)
IMM GRANULOCYTES # BLD AUTO: 0.03 K/UL (ref 0–0.04)
IMM GRANULOCYTES NFR BLD AUTO: 0.4 % (ref 0–0.5)
IRON SERPL-MCNC: 83 UG/DL (ref 30–160)
IRON SERPL-MCNC: 83 UG/DL (ref 30–160)
LDLC SERPL CALC-MCNC: 124.8 MG/DL (ref 63–159)
LH SERPL-ACNC: 1.9 MIU/ML
LYMPHOCYTES # BLD AUTO: 1.9 K/UL (ref 1–4.8)
LYMPHOCYTES NFR BLD: 25.2 % (ref 18–48)
MCH RBC QN AUTO: 27.9 PG (ref 27–31)
MCHC RBC AUTO-ENTMCNC: 32.4 G/DL (ref 32–36)
MCV RBC AUTO: 86 FL (ref 82–98)
MONOCYTES # BLD AUTO: 0.5 K/UL (ref 0.3–1)
MONOCYTES NFR BLD: 7.1 % (ref 4–15)
NEUTROPHILS # BLD AUTO: 4.8 K/UL (ref 1.8–7.7)
NEUTROPHILS NFR BLD: 64.5 % (ref 38–73)
NONHDLC SERPL-MCNC: 149 MG/DL
NRBC BLD-RTO: 0 /100 WBC
PLATELET # BLD AUTO: 322 K/UL (ref 150–450)
PMV BLD AUTO: 9.7 FL (ref 9.2–12.9)
POTASSIUM SERPL-SCNC: 4.1 MMOL/L (ref 3.5–5.1)
PROT SERPL-MCNC: 7.6 G/DL (ref 6–8.4)
RBC # BLD AUTO: 4.62 M/UL (ref 4–5.4)
SATURATED IRON: 20 % (ref 20–50)
SODIUM SERPL-SCNC: 139 MMOL/L (ref 136–145)
TOTAL IRON BINDING CAPACITY: 407 UG/DL (ref 250–450)
TRANSFERRIN SERPL-MCNC: 275 MG/DL (ref 200–375)
TRIGL SERPL-MCNC: 121 MG/DL (ref 30–150)
TSH SERPL DL<=0.005 MIU/L-ACNC: 1.46 UIU/ML (ref 0.4–4)
URATE SERPL-MCNC: 4 MG/DL (ref 2.4–5.7)
VIT B12 SERPL-MCNC: 589 PG/ML (ref 210–950)
WBC # BLD AUTO: 7.5 K/UL (ref 3.9–12.7)

## 2024-02-21 PROCEDURE — 86200 CCP ANTIBODY: CPT | Performed by: INTERNAL MEDICINE

## 2024-02-21 PROCEDURE — 80061 LIPID PANEL: CPT | Performed by: INTERNAL MEDICINE

## 2024-02-21 PROCEDURE — 83001 ASSAY OF GONADOTROPIN (FSH): CPT | Performed by: INTERNAL MEDICINE

## 2024-02-21 PROCEDURE — 84443 ASSAY THYROID STIM HORMONE: CPT | Performed by: INTERNAL MEDICINE

## 2024-02-21 PROCEDURE — 82607 VITAMIN B-12: CPT | Performed by: INTERNAL MEDICINE

## 2024-02-21 PROCEDURE — 84550 ASSAY OF BLOOD/URIC ACID: CPT | Performed by: INTERNAL MEDICINE

## 2024-02-21 PROCEDURE — 80053 COMPREHEN METABOLIC PANEL: CPT | Performed by: INTERNAL MEDICINE

## 2024-02-21 PROCEDURE — 82728 ASSAY OF FERRITIN: CPT | Performed by: INTERNAL MEDICINE

## 2024-02-21 PROCEDURE — 36415 COLL VENOUS BLD VENIPUNCTURE: CPT | Performed by: INTERNAL MEDICINE

## 2024-02-21 PROCEDURE — 85025 COMPLETE CBC W/AUTO DIFF WBC: CPT | Performed by: INTERNAL MEDICINE

## 2024-02-21 PROCEDURE — 83036 HEMOGLOBIN GLYCOSYLATED A1C: CPT | Performed by: INTERNAL MEDICINE

## 2024-02-21 PROCEDURE — 83540 ASSAY OF IRON: CPT | Performed by: INTERNAL MEDICINE

## 2024-02-21 PROCEDURE — 83002 ASSAY OF GONADOTROPIN (LH): CPT | Performed by: INTERNAL MEDICINE

## 2024-02-21 PROCEDURE — 86677 HELICOBACTER PYLORI ANTIBODY: CPT | Performed by: INTERNAL MEDICINE

## 2024-02-22 LAB — H PYLORI IGG SERPL QL IA: NEGATIVE

## 2024-12-06 ENCOUNTER — HOSPITAL ENCOUNTER (OUTPATIENT)
Dept: RADIOLOGY | Facility: HOSPITAL | Age: 44
Discharge: HOME | End: 2024-12-06
Attending: INTERNAL MEDICINE
Payer: MEDICAID

## 2024-12-06 DIAGNOSIS — M54.50 LOW BACK PAIN: Primary | ICD-10-CM

## 2024-12-06 DIAGNOSIS — M54.50 LOW BACK PAIN: ICD-10-CM

## 2024-12-06 PROCEDURE — 72110 X-RAY EXAM L-2 SPINE 4/>VWS: CPT | Mod: 26,,, | Performed by: INTERNAL MEDICINE

## 2024-12-06 PROCEDURE — 72110 X-RAY EXAM L-2 SPINE 4/>VWS: CPT | Mod: TC,FY

## 2025-06-09 ENCOUNTER — HOSPITAL ENCOUNTER (OUTPATIENT)
Dept: RADIOLOGY | Facility: HOSPITAL | Age: 45
Discharge: HOME | End: 2025-06-09
Attending: INTERNAL MEDICINE
Payer: MEDICAID

## 2025-06-09 DIAGNOSIS — M54.2 NECK PAIN: Primary | ICD-10-CM

## 2025-06-09 DIAGNOSIS — M54.2 NECK PAIN: ICD-10-CM

## 2025-06-09 PROCEDURE — 72052 X-RAY EXAM NECK SPINE 6/>VWS: CPT | Mod: 26,,, | Performed by: RADIOLOGY

## 2025-06-09 PROCEDURE — 72052 X-RAY EXAM NECK SPINE 6/>VWS: CPT | Mod: TC,FY
